# Patient Record
Sex: MALE | Race: WHITE | NOT HISPANIC OR LATINO | Employment: OTHER | URBAN - METROPOLITAN AREA
[De-identification: names, ages, dates, MRNs, and addresses within clinical notes are randomized per-mention and may not be internally consistent; named-entity substitution may affect disease eponyms.]

---

## 2024-01-11 ENCOUNTER — TELEPHONE (OUTPATIENT)
Dept: NEUROSURGERY | Facility: CLINIC | Age: 84
End: 2024-01-11
Payer: COMMERCIAL

## 2024-01-11 NOTE — TELEPHONE ENCOUNTER
[facility] Call Center    Phone Message    May a detailed message be left on voicemail: yes     Reason for Call: Other: Patient called requesting to schedule with Dr. Rosales, stating he is a former patient. Patient states he has been having trouble walking and has felt unsteady on his feet. He states he has had one stroke and three TIAs within the last couple months. Patient states he feels there is a sense of urgency to schedule, and that he has been tentatively diagnosis with Hydrocephalus By Dr. Smith with [facility]. Patient is unsure if Dr. Smith has sent his records or not, he states he has had extensive testing and is losing confidence in Dr. Smith.      Action Taken: Message routed to:  [facility] ([facility] Neurosurgery    Travel Screening: Not Applicable

## 2024-01-18 ENCOUNTER — CARE COORDINATION (OUTPATIENT)
Dept: NEUROSURGERY | Facility: CLINIC | Age: 84
End: 2024-01-18
Payer: COMMERCIAL

## 2024-01-18 NOTE — PROGRESS NOTES
Writer obtained current and corresponding imaging from [facility], sent message to provider for review.     Samia Schwartz LPN  Neurosurgery

## 2024-01-22 NOTE — TELEPHONE ENCOUNTER
RECORDS RECEIVED FROM: Care Everywhere   REASON FOR VISIT: Cerebrovascular dural AV fistula/ LOV 2015   Date of Appt: 1/24/24 @ 8:00 am    NOTES (FOR ALL VISITS) STATUS DETAILS   OFFICE NOTE from other specialist Care Everywhere 1/18/24, 12/20/22, 1/31/20  Ankit Castro DO  @[facility]    1/3/24 Jorge Smith DO  @[facility]    2/10/23 Alba Draper, MEMO, CNP  @[facility]    8/3/21 Hiwot Berkowitz MD  @[facility]    3/5/20 Elvia Callejas PA-C  [facility]    10/6/15, 7/7/15, 6/9/15, 3/3/15 Ember Rosales MD @[facility]NeuroSurg    5/12/15 Zoraida Mclaughlin PA @[facility]-NeuroSurg     DISCHARGE SUMMARY from hospital Care Everywhere 12/10/23-12/12/23 Pippa Tan MD  @[facility]    12/12/22-12/14/22 Shayne Ayers MD  @[facility]    1/22/20-1/23/20 Tatiana Ardon DO  @[facility]     4/29/15-5/2/15 Ember Rosales MD @[facility]     DISCHARGE REPORT from the ER Care Everywhere 1/5/24 Yazmin Rosario DO  @[facility] ED     OPERATIVE REPORT Internal 4/29/15 Ember Rosales MD @[facility] Midline Suboccipital Approach For Left Arteriovenous Fistula Removal; Intraoperative Angiography      MEDICATION LIST Internal    IMAGING  (FOR ALL VISITS)     MRI (HEAD, NECK, SPINE) Received [facility]  1/5/24 MR Brain  12/11/23 MR Cervical Spine  12/13/22 MR Brain     CT (HEAD, NECK, SPINE) Received [facility]  12/10/23 CT Head  11/19/23 CT Head  12/12/22 CTA Head Neck  12/12/22  CT Head

## 2024-01-24 ENCOUNTER — PRE VISIT (OUTPATIENT)
Dept: NEUROSURGERY | Facility: CLINIC | Age: 84
End: 2024-01-24

## 2024-01-24 ENCOUNTER — OFFICE VISIT (OUTPATIENT)
Dept: NEUROSURGERY | Facility: CLINIC | Age: 84
End: 2024-01-24
Payer: COMMERCIAL

## 2024-01-24 VITALS
OXYGEN SATURATION: 96 % | DIASTOLIC BLOOD PRESSURE: 55 MMHG | WEIGHT: 151 LBS | SYSTOLIC BLOOD PRESSURE: 89 MMHG | HEART RATE: 99 BPM | HEIGHT: 70 IN | BODY MASS INDEX: 21.62 KG/M2 | RESPIRATION RATE: 16 BRPM

## 2024-01-24 DIAGNOSIS — F41.9 ANXIETY: ICD-10-CM

## 2024-01-24 DIAGNOSIS — G91.2 NPH (NORMAL PRESSURE HYDROCEPHALUS) (H): Primary | ICD-10-CM

## 2024-01-24 PROCEDURE — 99204 OFFICE O/P NEW MOD 45 MIN: CPT | Performed by: NURSE PRACTITIONER

## 2024-01-24 RX ORDER — CLOPIDOGREL BISULFATE 75 MG/1
1 TABLET ORAL DAILY
COMMUNITY
Start: 2023-02-10 | End: 2024-05-28

## 2024-01-24 RX ORDER — ASPIRIN 81 MG/1
81 TABLET, CHEWABLE ORAL
COMMUNITY

## 2024-01-24 RX ORDER — FLUTICASONE PROPIONATE 50 MCG
2 SPRAY, SUSPENSION (ML) NASAL
COMMUNITY
Start: 2023-10-02

## 2024-01-24 RX ORDER — ATORVASTATIN CALCIUM 40 MG/1
40 TABLET, FILM COATED ORAL EVERY EVENING
COMMUNITY
Start: 2023-12-11

## 2024-01-24 RX ORDER — TAMSULOSIN HYDROCHLORIDE 0.4 MG/1
2 CAPSULE ORAL DAILY
COMMUNITY
Start: 2024-01-19

## 2024-01-24 RX ORDER — AMLODIPINE BESYLATE 5 MG/1
5 TABLET ORAL DAILY
COMMUNITY
Start: 2024-01-05 | End: 2024-07-16

## 2024-01-24 ASSESSMENT — PAIN SCALES - GENERAL: PAINLEVEL: NO PAIN (0)

## 2024-01-24 ASSESSMENT — PATIENT HEALTH QUESTIONNAIRE - PHQ9: SUM OF ALL RESPONSES TO PHQ QUESTIONS 1-9: 7

## 2024-01-24 NOTE — PROGRESS NOTES
[facility]  Department of Neurosurgery      Name: Perry Soni  MRN: [id number]  Age: 83 year old  : 1940  Referring provider: No ref. provider found  2024      Chief Complaint:   Hydrocephalus  New Patient    History of Present Illness:   Perry Soni is a 83 year old male with a history of severe cervical stenosis, craniocervical dural arteriovenous fistula (asymptomatic), S/p radiosurgery in 2012 at [facility], and subsequently underwent lateral suboccipital approach for disconnection of the arteriovenous fistula on 2015 by Dr. Rosales. The intraoperative angiogram demonstrated no residual AVF. Most recently seen by Dr. Rosales in 10/2015 and 5 year follow-up MRA was recommended at that time.     Then since 2022, patient had at least 2-3 TIAs. Most recently, he was admitted to [facility] in 2023 after frequent falls. Per notes in Care Everywhere, CT head on admission showing chronic changes without acute intracranial abnormality. Neurology consulted. MR thoracic, cervical spine recommended which showed a subacute compression fracture along superior endplate of T12, severe right neural foraminal stenosis at C3-4, and severe left neural foraminal stenosis with effacement left lateral recess at C5-6. Neurosurgery consulted. Increased cerebral ventricle size on CT with some transependymal flow concerning for potentialnormal pressure hydrocephalus. Outpatient workup for NPH with large volume lumbar puncture and therapy evaluation recommended with neurology.     He was evaluated by Dr. Jorge Smith at  and underwent large volume spina tap in 2023. Per prior notes, He did show improvement in his gait on objective measurements. Both he and his wife would agree that his gait, sense of stability etcetera improved in fairly quick fashion after the lumbar puncture. He also had 63 white blood cells off his spinal fluid. Brain MRI was completed on 2024 to follow-up which  showed Acute or early subacute lacunar infarct involving the left parietal lobe measuring up to 1.6 cm in length.     Today, patient presents for the evaluation with his wife, Jono to discuss possible options for recently diagnosed NPH. Patient reports gait impairment for the past 1-2 years. He had at least 2 falls recently. He started using a wheeled walker after his hospitalization in 12/2023. Today, patient and spouse reports that they noticed moderate improvement in his gait post spinal tap in 12/2023. Currently, he walks independently within his house and uses a cane for longer distances.     Patient reports cognitive concerns, word finding difficulty since his recent TIAs. He also has urinary urgency and frequency without any incontinence.     Patient completed a brain MRA recently. Images are not in PACS for review yet.     Today, patient had a high score for PHQ-9 screening. Patient reports that his recent changes in speech, memory along with gait imbalance has been frustrating. Prior to the recent TIAs, he was able to go to the gym three times a week, which he I not doing presently. He reports some passive thoughts of suicide, but none at the present time.    Review of Systems:   Pertinent items are noted in HPI or as in patient entered ROS below, remainder of complete ROS is negative.        No data to display                 Active Medications:     Current Outpatient Medications:     acetaminophen 650 MG TABS, Take 650 mg by mouth every 6 hours as needed for mild pain, Disp: 100 tablet, Rfl:     acyclovir (ZOVIRAX) 5 % ointment, Apply 0.5 inches topically 5 times daily. PRN, Disp: , Rfl:     FLUoxetine HCl (PROZAC PO), Take 10 mg by mouth daily , Disp: , Rfl:     simvastatin (ZOCOR) 20 MG tablet, Take 20 mg by mouth At Bedtime., Disp: , Rfl:       Allergies:   Wellbutrin [bupropion hcl]      Past Medical History:  Past Medical History:   Diagnosis Date    AVM (arteriovenous malformation)     Depression   "   Hyperlipidemia      Patient Active Problem List   Diagnosis    Cerebrovascular dural AV fistula    Dural arteriovenous fistula        Past Surgical History:  Past Surgical History:   Procedure Laterality Date    abd hernia repair      CRANIOTOMY, REPAIR ARTERIOVENOUS MALFORMATION, COMBINED Left 2015    Procedure: COMBINED CRANIOTOMY, REPAIR ARTERIOVENOUS MALFORMATION;  Surgeon: Ember Rosales MD;  Location: [facility] OR    HERNIA REPAIR         Family History:   No family history on file.      Social History:   Social History     Tobacco Use    Smoking status: Former     Packs/day: 1.00     Years: 15.00     Additional pack years: 0.00     Total pack years: 15.00     Types: Cigarettes     Quit date: 1981     Years since quittin.3    Smokeless tobacco: Never   Substance Use Topics    Alcohol use: No    Drug use: No        Physical Exam:   BP (!) 89/55 (BP Location: Right arm, Patient Position: Sitting)   Pulse 99   Resp 16   Ht 1.778 m (5' 10\")   Wt 68.5 kg (151 lb)   SpO2 96%   BMI 21.67 kg/m     General: No acute distress.   Neuro: The patient is fully oriented to self, place, month and year. Speech is normal. Extraocular movements are intact without nystagmus. There is no pronator drift. Tremors noted bilateral hands. Full strength in all extremities except mild weakness in right leg. Sensation intact throughout. No dysmetria with finger-nose-finger testing. Gait is slow with wide base.   Psych: Normal mood and affect. Behavior is normal.      Imagin2024 MRI Brain:  IMPRESSION:      1. 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   2. Stable ventriculomegaly.   3. Stable moderate periventricular and subcortical white matter T2/FLAIR signal hyperintensities, commonly seen with chronic microvascular ischemia.     12/10/2023 Head CT:  FINDINGS:  There is no evidence of intracranial hemorrhage, mass effect, midline " shift, acute infarct or hydrocephalus.  There are no abnormal intraaxial or extraaxial fluid collections.  Generalized volume loss and chronic small vessel ischemic change similar to prior examination.  Visualized paranasal sinuses are well-aerated and clear. Postsurgical changes of the posterior occipital bone.      Assessment:  Hydrocephalus  New Patient    Plan:  83 year old right handed male with a h/o recent TIA, stroke, severe cervical stenosis, craniocervical dural arteriovenous fistula (asymptomatic), S/p radiosurgery in 1/2012, disconnection of the arteriovenous fistula on 04/29/2015 by Dr. Rosales, recent imaging showing hydrocephalus is here for further evaluation and possible management of NPH. Will discuss case with Dr. Rosales and will contact the patient with next steps.     Addendum on 1/30/2024: Discussed with Dr. Rosales who recommended additievaluation by Neurology. Referral order placed.     Addendum on 2/15/2024:   Patient recently had an appointment with Dr. Cody Wallis, Neurologist at .  Based on his  evaluation and available information, it appears that patient's  symptoms align more closely with Normal Pressure Hydrocephalus except a Neuropsychological evaluation since symptom onset. Also,it is unclear what the cause for his elevated white blood cell count in the CSF in 12/2023. There is no evidence of inflammatory disease on MRI of the brain. Ideally a repeat LP would be undertaken but with his recent stroke he is reluctant to have the patient hold his antiplatelet agents this soon after his stroke.      We will schedule the patient for a phone visit with Dr. Rosales to discuss options. Message sent to clinic team.     Vesta Venegas CNP  Department of Neurosurgery    I spent 50 minutes on patient care activities related to this encounter on the date of service, including time spent reviewing the chart, obtaining history and examination and in counseling the patient, and in documentation  in the electronic medical record.

## 2024-01-24 NOTE — LETTER
2024       RE: Perry Soni  [address] [place] [address]     Dear Colleague,    Thank you for referring your patient, Perry Soni, to the [facility] [place] [facility]. Please see a copy of my visit note below.    [facility]  Department of Neurosurgery      Name: Perry Soni  MRN: [id number]  Age: 83 year old  : 1940  Referring provider: No ref. provider found  2024      Chief Complaint:   Hydrocephalus  New Patient    History of Present Illness:   Perry Soni is a 83 year old male with a history of severe cervical stenosis, craniocervical dural arteriovenous fistula (asymptomatic), S/p radiosurgery in 2012 at [facility], and subsequently underwent lateral suboccipital approach for disconnection of the arteriovenous fistula on 2015 by Dr. Rosales. The intraoperative angiogram demonstrated no residual AVF. Most recently seen by Dr. Rosales in 10/2015 and 5 year follow-up MRA was recommended at that time.     Then since 2022, patient had at least 2-3 TIAs. Most recently, he was admitted to [facility] in 2023 after frequent falls. Per notes in Care Everywhere, CT head on admission showing chronic changes without acute intracranial abnormality. Neurology consulted. MR thoracic, cervical spine recommended which showed a subacute compression fracture along superior endplate of T12, severe right neural foraminal stenosis at C3-4, and severe left neural foraminal stenosis with effacement left lateral recess at C5-6. Neurosurgery consulted. Increased cerebral ventricle size on CT with some transependymal flow concerning for potentialnormal pressure hydrocephalus. Outpatient workup for NPH with large volume lumbar puncture and therapy evaluation recommended with neurology.     He was evaluated by Dr. Jorge Smith at  and underwent large volume spina tap in 2023. Per  prior notes, He did show improvement in his gait on objective measurements. Both he and his wife would agree that his gait, sense of stability etcetera improved in fairly quick fashion after the lumbar puncture. He also had 63 white blood cells off his spinal fluid. Brain MRI was completed on 1/5/2024 to follow-up which showed Acute or early subacute lacunar infarct involving the left parietal lobe measuring up to 1.6 cm in length.     Today, patient presents for the evaluation with his wife, Jono to discuss possible options for recently diagnosed NPH. Patient reports gait impairment for the past 1-2 years. He had at least 2 falls recently. He started using a wheeled walker after his hospitalization in 12/2023. Today, patient and spouse reports that they noticed moderate improvement in his gait post spinal tap in 12/2023. Currently, he walks independently within his house and uses a cane for longer distances.     Patient reports cognitive concerns, word finding difficulty since his recent TIAs. He also has urinary urgency and frequency without any incontinence.     Patient completed a brain MRA recently. Images are not in PACS for review yet.     Today, patient had a high score for PHQ-9 screening. Patient reports that his recent changes in speech, memory along with gait imbalance has been frustrating. Prior to the recent TIAs, he was able to go to the gym three times a week, which he I not doing presently. He reports some passive thoughts of suicide, but none at the present time.    Review of Systems:   Pertinent items are noted in HPI or as in patient entered ROS below, remainder of complete ROS is negative.        No data to display                 Active Medications:     Current Outpatient Medications:     acetaminophen 650 MG TABS, Take 650 mg by mouth every 6 hours as needed for mild pain, Disp: 100 tablet, Rfl:     acyclovir (ZOVIRAX) 5 % ointment, Apply 0.5 inches topically 5 times daily. PRN, Disp: , Rfl:  "    FLUoxetine HCl (PROZAC PO), Take 10 mg by mouth daily , Disp: , Rfl:     simvastatin (ZOCOR) 20 MG tablet, Take 20 mg by mouth At Bedtime., Disp: , Rfl:       Allergies:   Wellbutrin [bupropion hcl]      Past Medical History:  Past Medical History:   Diagnosis Date    AVM (arteriovenous malformation)     Depression     Hyperlipidemia      Patient Active Problem List   Diagnosis    Cerebrovascular dural AV fistula    Dural arteriovenous fistula        Past Surgical History:  Past Surgical History:   Procedure Laterality Date    abd hernia repair      CRANIOTOMY, REPAIR ARTERIOVENOUS MALFORMATION, COMBINED Left 2015    Procedure: COMBINED CRANIOTOMY, REPAIR ARTERIOVENOUS MALFORMATION;  Surgeon: Ember Rosales MD;  Location: [facility] OR    HERNIA REPAIR         Family History:   No family history on file.      Social History:   Social History     Tobacco Use    Smoking status: Former     Packs/day: 1.00     Years: 15.00     Additional pack years: 0.00     Total pack years: 15.00     Types: Cigarettes     Quit date: 1981     Years since quittin.3    Smokeless tobacco: Never   Substance Use Topics    Alcohol use: No    Drug use: No        Physical Exam:   BP (!) 89/55 (BP Location: Right arm, Patient Position: Sitting)   Pulse 99   Resp 16   Ht 1.778 m (5' 10\")   Wt 68.5 kg (151 lb)   SpO2 96%   BMI 21.67 kg/m     General: No acute distress.   Neuro: The patient is fully oriented to self, place, month and year. Speech is normal. Extraocular movements are intact without nystagmus. There is no pronator drift. Tremors noted bilateral hands. Full strength in all extremities except mild weakness in right leg. Sensation intact throughout. No dysmetria with finger-nose-finger testing. Gait is slow with wide base.   Psych: Normal mood and affect. Behavior is normal.      Imagin2024 MRI Brain:  IMPRESSION:      1. Acute or early subacute lacunar infarct involving the left parietal lobe " measuring up to 1.6 cm in length.   2. Stable ventriculomegaly.   3. Stable moderate periventricular and subcortical white matter T2/FLAIR signal hyperintensities, commonly seen with chronic microvascular ischemia.     12/10/2023 Head CT:  FINDINGS:  There is no evidence of intracranial hemorrhage, mass effect, midline shift, acute infarct or hydrocephalus.  There are no abnormal intraaxial or extraaxial fluid collections.  Generalized volume loss and chronic small vessel ischemic change similar to prior examination.  Visualized paranasal sinuses are well-aerated and clear. Postsurgical changes of the posterior occipital bone.      Assessment:  Hydrocephalus  New Patient    Plan:  83 year old right handed male with a h/o recent TIA, stroke, severe cervical stenosis, craniocervical dural arteriovenous fistula (asymptomatic), S/p radiosurgery in 1/2012, disconnection of the arteriovenous fistula on 04/29/2015 by Dr. Rosales, recent imaging showing hydrocephalus is here for further evaluation and possible management of NPH. Will discuss case with Dr. Rosales and will contact the patient with next steps.       I spent 50 minutes on patient care activities related to this encounter on the date of service, including time spent reviewing the chart, obtaining history and examination and in counseling the patient, and in documentation in the electronic medical record.      Again, thank you for allowing me to participate in the care of your patient.      Sincerely,    MEMO Rosa CNP

## 2024-01-30 ENCOUNTER — TELEPHONE (OUTPATIENT)
Dept: NEUROSURGERY | Facility: CLINIC | Age: 84
End: 2024-01-30
Payer: COMMERCIAL

## 2024-01-30 NOTE — TELEPHONE ENCOUNTER
----- Message from MEMO Rosa CNP sent at 1/30/2024  4:31 PM CST -----  Hi,    Can you please let the patient know that I spoke with Dr. Rosales and he recommended referral to neurology for additional work up? Referral is placed.     Thanks,  Vesta

## 2024-01-30 NOTE — TELEPHONE ENCOUNTER
Spoke with Perry, explained Dr Rosales referred patient to Neurology.  Neurology Provider Referral completed.      Voices understanding.

## 2024-02-16 ENCOUNTER — TELEPHONE (OUTPATIENT)
Dept: NEUROSURGERY | Facility: CLINIC | Age: 84
End: 2024-02-16
Payer: COMMERCIAL

## 2024-02-26 NOTE — TELEPHONE ENCOUNTER
RECORDS RECEIVED FROM: Care Everywhere   REASON FOR VISIT: Discuss options   PROVIDER: Ember Rosales MD   DATE OF APPT: 4/23/24 @ 10:30 am    NOTES (FOR ALL VISITS) STATUS DETAILS   OFFICE NOTE from referring provider Internal 1/30/24 [facility] C[facility]     OFFICE NOTE from other specialist Care Everywhere 2/9/24 [facility]    1/18/24, 12/20/22, 1/31/20  Ankit Castro DO  @[facility]     1/3/24 Jorge Smith DO  @[facility]     2/10/23 Alba Draper APRN, CNP  [facility]     8/3/21 Hiwot Berkowitz MD  @[facility]     3/5/20 Elvia Callejas PA-C  @[facility] Neurology     10/6/15, 7/7/15, 6/9/15, 3/3/15 [facility]     5/12/15 [facility]     DISCHARGE SUMMARY from hospital Care Everywhere 12/10/23-12/12/23 Pippa Tan MD  @[facility]     12/12/22-12/14/22 Shayne Ayers MD  @[facility]     1/22/20-1/23/20 Tatiana Ardon DO  @[facility]      4/29/15-5/2/15 Ember Rosales MD @[facility]     DISCHARGE REPORT from the ER Care Everywhere 1/5/24 Yazmin Rosario DO  @[facility] ED      OPERATIVE REPORT Internal 4/29/15 Ember Rosales MD @[facility] Midline Suboccipital Approach For Left Arteriovenous Fistula Removal; Intraoperative Angiography       MEDICATION LIST Internal    IMAGING  (FOR ALL VISITS)     MRI (HEAD, NECK, SPINE) Received [facility]  1/23/24 MRA Brain ([place])  1/23/24 MRA Neck ([place])  1/5/24 MR Brain  12/11/23 MR Cervical Spine  12/13/22 MR Brain     CT (HEAD, NECK, SPINE) Received [facility]  12/10/23 CT Head  11/19/23 CT Head  12/12/22 CTA Head Neck  12/12/22  CT Head

## 2024-03-20 ENCOUNTER — TRANSFERRED RECORDS (OUTPATIENT)
Dept: HEALTH INFORMATION MANAGEMENT | Facility: CLINIC | Age: 84
End: 2024-03-20

## 2024-04-23 ENCOUNTER — VIRTUAL VISIT (OUTPATIENT)
Dept: NEUROSURGERY | Facility: CLINIC | Age: 84
End: 2024-04-23
Attending: NURSE PRACTITIONER
Payer: COMMERCIAL

## 2024-04-23 ENCOUNTER — TELEPHONE (OUTPATIENT)
Dept: NEUROSURGERY | Facility: CLINIC | Age: 84
End: 2024-04-23

## 2024-04-23 ENCOUNTER — PRE VISIT (OUTPATIENT)
Dept: NEUROSURGERY | Facility: CLINIC | Age: 84
End: 2024-04-23

## 2024-04-23 VITALS — WEIGHT: 151 LBS | HEIGHT: 70 IN | BODY MASS INDEX: 21.62 KG/M2

## 2024-04-23 DIAGNOSIS — G91.2 IDIOPATHIC NORMAL PRESSURE HYDROCEPHALUS (INPH) (H): ICD-10-CM

## 2024-04-23 DIAGNOSIS — I67.1 CEREBROVASCULAR DURAL AV FISTULA: Primary | ICD-10-CM

## 2024-04-23 PROCEDURE — 99443 PR PHYSICIAN TELEPHONE EVALUATION 21-30 MIN: CPT | Mod: 95 | Performed by: NEUROLOGICAL SURGERY

## 2024-04-23 RX ORDER — CEPHALEXIN 500 MG/1
CAPSULE ORAL
Status: CANCELLED | OUTPATIENT
Start: 2024-04-23

## 2024-04-23 RX ORDER — CEPHALEXIN 500 MG/1
CAPSULE ORAL
Status: ON HOLD | COMMUNITY
Start: 2024-04-15 | End: 2024-06-17

## 2024-04-23 ASSESSMENT — PAIN SCALES - GENERAL: PAINLEVEL: MILD PAIN (3)

## 2024-04-23 NOTE — NURSING NOTE
Is the patient currently in the state of [place]? YES    Visit mode:TELEPHONE    If the visit is dropped, the patient can be reconnected by: TELEPHONE VISIT: Phone number: [phone]    Will anyone else be joining the visit? NO  (If patient encounters technical issues they should call [phone] :[phone])    How would you like to obtain your AVS? MyChart    Are changes needed to the allergy or medication list? No    Are refills needed on medications prescribed by this physician? YES, pt requesting refill on antibiotics, not prescribed by Dr. Rosales    Reason for visit: Consult    Madelyn BURTONF

## 2024-04-23 NOTE — TELEPHONE ENCOUNTER
Left voicemail for patient regarding scheduling surgery with Dr. Rosales.  Provided contact number to discuss.  [phone]    Chelsea Mckeon, on 4/23/2024 at 3:06 PM

## 2024-04-23 NOTE — PROGRESS NOTES
Virtual Visit Details  Date of service: 4/23/2024  Type of service: Telephone visit  Length of service: 30 minutes    Diego Barcenas MD  [facility]  [address] [place] [address]       Dear Dr. Barcenas:    We spoke to Mr. Soni as part of a telephone visit in cranial neurosurgery clinic today.  We know him well, we have not spoken to him in several years.  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  He has had 2 years of gait instability and has fallen once.  He is able to ambulate independently around his home but uses a cane occasionally.  He has had urinary frequency and urgency.  He had a Rendon catheter placed last week, and he has a urology appointment pending.  He feels that his cognition has declined particularly with memory, though he was quite sharp during our conversation today.  He describes word finding difficulty.  Neuropsychological testing was done a few weeks ago with Dr. Castillo ([id number]) but those records were not available to us.     He has undergone a thorough workup with Dr. Wallis, and we have reviewed his notes.  Mr. Soni underwent a lumbar puncture in December 2023, when 30 cc of clear CSF was removed.  There was improvement in his gait following the LP.  Of note, the CSF had an elevated white blood cell count (63).  He also underwent MRI of the brain in January to evaluate the pleocytosis.  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  He has ventriculomegaly.  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  He is on aspirin and Plavix currently.  He lives with his wife and daughter in [place].    Over the phone, his speech, language, and phonation were normal.    His imaging study showed ventriculomegaly.  He has some diffuse atrophy but the ventricle  enlargement is somewhat disproportionate.    Mr. Soni will likely benefit from permanent CSF diversion with a ventriculoperitoneal shunt.  However, it will be prudent to repeat a lumbar puncture to check the cell counts before proceeding with shunt placement.  We instructed him to discontinue the Plavix and remain on the aspirin.  We will repeat the lumbar puncture under fluoroscopic guidance sometime in the next week.  Depending on the results, we can make plans about shunt placement.  We will keep you informed of his progress.  Please nested contact us with questions.    Sincerely,        Ember Rosales MD  Department of Neurosurgery

## 2024-04-23 NOTE — PATIENT INSTRUCTIONS
We will contact you to schedule your lumbar puncture.     Stroke & Endovascular RN Care Coordinators:    Jackie Mahoney, RN, BSN  Sunni Adams, RN, CNRN, SCRN    If you have any questions please contact the RN Care Coordinators at [phone], option 1.     After business hours call the  at [phone] and have the Neuro-Interventional Fellow paged.    Thank you for choosing [facility] for your health care needs.

## 2024-04-23 NOTE — LETTER
4/23/2024       RE: Perry Soni  [address] [place] [address]       Dear Colleague,    Thank you for referring your patient, Perry Soni, to the [facility] NEUROSURGERY CLINIC [place] at [facility]. Please see a copy of my visit note below.    Diego Barcenas MD  [facility]  [address] [place] [address]     Dear Dr. Barcenas:    We spoke to Mr. Soni as part of a telephone visit in cranial neurosurgery clinic today.  We know him well, we have not spoken to him in several years.  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  He has had 2 years of gait instability and has fallen once.  He is able to ambulate independently around his home but uses a cane occasionally.  He has had urinary frequency and urgency.  He had a Rendon catheter placed last week, and he has a urology appointment pending.  He feels that his cognition has declined particularly with memory, though he was quite sharp during our conversation today.  He describes word finding difficulty.  Neuropsychological testing was done a few weeks ago with Dr. Castillo ([id number]) but those records were not available to us.     He has undergone a thorough workup with Dr. Wallis, and we have reviewed his notes.  Mr. Soni underwent a lumbar puncture in December 2023, when 30 cc of clear CSF was removed.  There was improvement in his gait following the LP.  Of note, the CSF had an elevated white blood cell count (63).  He also underwent MRI of the brain in January to evaluate the pleocytosis.  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  He has ventriculomegaly.  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  He is on aspirin and Plavix currently.  He lives with his  wife and daughter in [place].    Over the phone, his speech, language, and phonation were normal.    His imaging study showed ventriculomegaly.  He has some diffuse atrophy but the ventricle enlargement is somewhat disproportionate.    Mr. Soni will likely benefit from permanent CSF diversion with a ventriculoperitoneal shunt.  However, it will be prudent to repeat a lumbar puncture to check the cell counts before proceeding with shunt placement.  We instructed him to discontinue the Plavix and remain on the aspirin.  We will repeat the lumbar puncture under fluoroscopic guidance sometime in the next week.  Depending on the results, we can make plans about shunt placement.  We will keep you informed of his progress.  Please nested contact us with questions.          Again, thank you for allowing me to participate in the care of your patient.      Sincerely,    Ember Rosales MD

## 2024-04-24 ENCOUNTER — PATIENT OUTREACH (OUTPATIENT)
Dept: NEUROLOGY | Facility: CLINIC | Age: 84
End: 2024-04-24
Payer: COMMERCIAL

## 2024-04-24 NOTE — PROGRESS NOTES
Informed patient of procedure instructions. Confirmed date and time. Patient verbalized understanding. All questions answered. Patient instructions sent via mail. Patient has my contact information and was encouraged to call with questions/concerns.   Jackie Mahoney RN 4/24/2024 2:53 PM

## 2024-04-24 NOTE — PATIENT INSTRUCTIONS
You are scheduled for a Lumbar Puncture with Dr. Rosales on 5/1/24. Arrival Time: 8:30 AM Procedure Time: 10:00 AM.     Please follow these instructions:     * Check in at the Gold Waiting Room at the [facility]. The address is [address] [place] [address]. The phone number is [phone].      * Nothing to eat for 8 hours prior to arrival time. You may drink clear liquids (includes water, Jell-O, clear broth, apple juice or any non-carbonated beverage that you can see through) up until 2 hours prior to arrival time.      * You may take your medications with a sip of water the morning of the procedure.   Stop Plavix, stay on Aspirin.      * You will be discharged the same day. You must have a  home and someone that can stay with you through the night.      PLEASE NOTE our COVID-19 visitors policy: Adult surgical and procedural patients can have two visitors throughout the surgery process. The two visitors may include children of any age; please note, children cannot stay in the waiting room alone.     All discharge instructions will be given to the  or volunteer. Documentation for the post-operative plan will be given to the patient and . Patients are required to have someone to stay with them for 24 hours after their procedure.     If you have questions regarding your procedure, please contact me at [phone], option 1.     If you need to cancel, reschedule or have procedure scheduling related questions, please call Neuroendovascular IR Procedure Scheduling at [phone].     Thank you,  Sunni Adams, RN, CNRN, SCRN  Jackie Mahoney, RN, BSN  Stroke & Neuroendovascular Care Coordinator

## 2024-04-24 NOTE — TELEPHONE ENCOUNTER
Received call that patient's son's phone number is incorrect. It does not belong to him. Left voicemail for patient regarding scheduling surgery with Dr. Rosales. Requested call back as soon as possible.  Provided contact number to discuss.  [phone]    Chelsea Mckeon, on 4/24/2024 at 9:01 AM

## 2024-04-24 NOTE — TELEPHONE ENCOUNTER
Spoke with the patient and was able to confirm all scheduled information.     Patient is scheduled for surgery with: Dr. Rosales    Surgery Date: 5/1     Location: [facility]    H&P: NOT NEEDED      Post-op: not needed at this time    Patient is aware of arrival and procedure times.       Patient questions/concerns: N/A     Surgery packet N/A       Chelsea Mckeon on 4/24/2024 at 1:16 PM

## 2024-04-24 NOTE — TELEPHONE ENCOUNTER
Left voicemail for  patient's son  regarding scheduling surgery with Dr. Rosales. Need to see which date next week works for them. Left voicemail for patient yesterday with no response. No MyChart available.  Provided contact number to discuss.  [phone]    Chelsea Mckeon, on 4/24/2024 at 8:41 AM

## 2024-04-30 ENCOUNTER — DOCUMENTATION ONLY (OUTPATIENT)
Dept: NEUROSURGERY | Facility: CLINIC | Age: 84
End: 2024-04-30
Payer: COMMERCIAL

## 2024-05-01 ENCOUNTER — HOSPITAL ENCOUNTER (OUTPATIENT)
Facility: CLINIC | Age: 84
Discharge: HOME | End: 2024-05-01
Attending: NEUROLOGICAL SURGERY | Admitting: NEUROLOGICAL SURGERY
Payer: COMMERCIAL

## 2024-05-01 ENCOUNTER — APPOINTMENT (OUTPATIENT)
Dept: INTERVENTIONAL RADIOLOGY/VASCULAR | Facility: CLINIC | Age: 84
End: 2024-05-01
Attending: NEUROLOGICAL SURGERY
Payer: COMMERCIAL

## 2024-05-01 ENCOUNTER — APPOINTMENT (OUTPATIENT)
Dept: MEDSURG UNIT | Facility: CLINIC | Age: 84
End: 2024-05-01
Attending: NEUROLOGICAL SURGERY
Payer: COMMERCIAL

## 2024-05-01 VITALS
HEART RATE: 81 BPM | SYSTOLIC BLOOD PRESSURE: 119 MMHG | RESPIRATION RATE: 16 BRPM | TEMPERATURE: 98 F | WEIGHT: 146 LBS | OXYGEN SATURATION: 96 % | BODY MASS INDEX: 20.95 KG/M2 | DIASTOLIC BLOOD PRESSURE: 57 MMHG

## 2024-05-01 DIAGNOSIS — R83.9 CSF ABNORMAL: ICD-10-CM

## 2024-05-01 DIAGNOSIS — I67.1 CEREBROVASCULAR DURAL AV FISTULA: ICD-10-CM

## 2024-05-01 LAB
ANION GAP SERPL CALCULATED.3IONS-SCNC: 12 MMOL/L (ref 7–15)
APPEARANCE CSF: CLEAR
APTT PPP: 33 SECONDS (ref 22–38)
BUN SERPL-MCNC: 22.3 MG/DL (ref 8–23)
CALCIUM SERPL-MCNC: 8.8 MG/DL (ref 8.8–10.2)
CHLORIDE SERPL-SCNC: 105 MMOL/L (ref 98–107)
COLOR CSF: COLORLESS
CREAT SERPL-MCNC: 1.29 MG/DL (ref 0.67–1.17)
DEPRECATED HCO3 PLAS-SCNC: 22 MMOL/L (ref 22–29)
EGFRCR SERPLBLD CKD-EPI 2021: 56 ML/MIN/1.73M2
ERYTHROCYTE [DISTWIDTH] IN BLOOD BY AUTOMATED COUNT: 14.2 % (ref 10–15)
GLUCOSE CSF-MCNC: 41 MG/DL (ref 40–70)
GLUCOSE SERPL-MCNC: 89 MG/DL (ref 70–99)
HCT VFR BLD AUTO: 40.2 % (ref 40–53)
HGB BLD-MCNC: 13.3 G/DL (ref 13.3–17.7)
HOLD SPECIMEN: NORMAL
HOLD SPECIMEN: NORMAL
INR PPP: 1.2 (ref 0.85–1.15)
LYMPH ABN NFR CSF MANUAL: 86 %
MCH RBC QN AUTO: 29.6 PG (ref 26.5–33)
MCHC RBC AUTO-ENTMCNC: 33.1 G/DL (ref 31.5–36.5)
MCV RBC AUTO: 89 FL (ref 78–100)
MONOS+MACROS NFR CSF MANUAL: 8 %
NEUTROPHILS NFR CSF MANUAL: 6 %
PLATELET # BLD AUTO: 233 10E3/UL (ref 150–450)
POTASSIUM SERPL-SCNC: 3.8 MMOL/L (ref 3.4–5.3)
PROT CSF-MCNC: 61.6 MG/DL (ref 15–45)
RBC # BLD AUTO: 4.5 10E6/UL (ref 4.4–5.9)
RBC # CSF MANUAL: 15 /UL (ref 0–2)
SODIUM SERPL-SCNC: 139 MMOL/L (ref 135–145)
TUBE # CSF: 3
WBC # BLD AUTO: 6.2 10E3/UL (ref 4–11)
WBC # CSF MANUAL: 61 /UL (ref 0–5)

## 2024-05-01 PROCEDURE — 85610 PROTHROMBIN TIME: CPT | Performed by: STUDENT IN AN ORGANIZED HEALTH CARE EDUCATION/TRAINING PROGRAM

## 2024-05-01 PROCEDURE — 85014 HEMATOCRIT: CPT | Performed by: STUDENT IN AN ORGANIZED HEALTH CARE EDUCATION/TRAINING PROGRAM

## 2024-05-01 PROCEDURE — 999N000142 HC STATISTIC PROCEDURE PREP ONLY

## 2024-05-01 PROCEDURE — 89051 BODY FLUID CELL COUNT: CPT | Performed by: INTERNAL MEDICINE

## 2024-05-01 PROCEDURE — 87529 HSV DNA AMP PROBE: CPT | Mod: 59

## 2024-05-01 PROCEDURE — 88108 CYTOPATH CONCENTRATE TECH: CPT | Mod: TC | Performed by: STUDENT IN AN ORGANIZED HEALTH CARE EDUCATION/TRAINING PROGRAM

## 2024-05-01 PROCEDURE — 99152 MOD SED SAME PHYS/QHP 5/>YRS: CPT | Mod: GC | Performed by: NEUROLOGICAL SURGERY

## 2024-05-01 PROCEDURE — 84157 ASSAY OF PROTEIN OTHER: CPT | Performed by: STUDENT IN AN ORGANIZED HEALTH CARE EDUCATION/TRAINING PROGRAM

## 2024-05-01 PROCEDURE — 62328 DX LMBR SPI PNXR W/FLUOR/CT: CPT | Mod: GC | Performed by: NEUROLOGICAL SURGERY

## 2024-05-01 PROCEDURE — 88108 CYTOPATH CONCENTRATE TECH: CPT | Mod: 26 | Performed by: PATHOLOGY

## 2024-05-01 PROCEDURE — 85730 THROMBOPLASTIN TIME PARTIAL: CPT | Performed by: STUDENT IN AN ORGANIZED HEALTH CARE EDUCATION/TRAINING PROGRAM

## 2024-05-01 PROCEDURE — 250N000009 HC RX 250: Performed by: STUDENT IN AN ORGANIZED HEALTH CARE EDUCATION/TRAINING PROGRAM

## 2024-05-01 PROCEDURE — 87075 CULTR BACTERIA EXCEPT BLOOD: CPT | Performed by: INTERNAL MEDICINE

## 2024-05-01 PROCEDURE — 87205 SMEAR GRAM STAIN: CPT | Performed by: STUDENT IN AN ORGANIZED HEALTH CARE EDUCATION/TRAINING PROGRAM

## 2024-05-01 PROCEDURE — 999N000132 HC STATISTIC PP CARE STAGE 1

## 2024-05-01 PROCEDURE — 258N000003 HC RX IP 258 OP 636: Performed by: NEUROLOGICAL SURGERY

## 2024-05-01 PROCEDURE — 36415 COLL VENOUS BLD VENIPUNCTURE: CPT | Performed by: STUDENT IN AN ORGANIZED HEALTH CARE EDUCATION/TRAINING PROGRAM

## 2024-05-01 PROCEDURE — 82945 GLUCOSE OTHER FLUID: CPT | Performed by: STUDENT IN AN ORGANIZED HEALTH CARE EDUCATION/TRAINING PROGRAM

## 2024-05-01 PROCEDURE — 82374 ASSAY BLOOD CARBON DIOXIDE: CPT | Performed by: STUDENT IN AN ORGANIZED HEALTH CARE EDUCATION/TRAINING PROGRAM

## 2024-05-01 PROCEDURE — 99152 MOD SED SAME PHYS/QHP 5/>YRS: CPT

## 2024-05-01 PROCEDURE — 250N000011 HC RX IP 250 OP 636: Performed by: STUDENT IN AN ORGANIZED HEALTH CARE EDUCATION/TRAINING PROGRAM

## 2024-05-01 RX ORDER — NALOXONE HYDROCHLORIDE 0.4 MG/ML
0.2 INJECTION, SOLUTION INTRAMUSCULAR; INTRAVENOUS; SUBCUTANEOUS
Status: DISCONTINUED | OUTPATIENT
Start: 2024-05-01 | End: 2024-05-01 | Stop reason: HOSPADM

## 2024-05-01 RX ORDER — FENTANYL CITRATE 50 UG/ML
25-50 INJECTION, SOLUTION INTRAMUSCULAR; INTRAVENOUS EVERY 5 MIN PRN
Status: DISCONTINUED | OUTPATIENT
Start: 2024-05-01 | End: 2024-05-01 | Stop reason: HOSPADM

## 2024-05-01 RX ORDER — FLUMAZENIL 0.1 MG/ML
0.2 INJECTION, SOLUTION INTRAVENOUS
Status: DISCONTINUED | OUTPATIENT
Start: 2024-05-01 | End: 2024-05-01 | Stop reason: HOSPADM

## 2024-05-01 RX ORDER — NALOXONE HYDROCHLORIDE 0.4 MG/ML
0.4 INJECTION, SOLUTION INTRAMUSCULAR; INTRAVENOUS; SUBCUTANEOUS
Status: DISCONTINUED | OUTPATIENT
Start: 2024-05-01 | End: 2024-05-01 | Stop reason: HOSPADM

## 2024-05-01 RX ORDER — LIDOCAINE HYDROCHLORIDE 10 MG/ML
1-30 INJECTION, SOLUTION EPIDURAL; INFILTRATION; INTRACAUDAL; PERINEURAL
Status: COMPLETED | OUTPATIENT
Start: 2024-05-01 | End: 2024-05-01

## 2024-05-01 RX ORDER — SODIUM CHLORIDE 9 MG/ML
INJECTION, SOLUTION INTRAVENOUS CONTINUOUS
Status: DISCONTINUED | OUTPATIENT
Start: 2024-05-01 | End: 2024-05-01 | Stop reason: HOSPADM

## 2024-05-01 RX ADMIN — MIDAZOLAM 0.5 MG: 1 INJECTION INTRAMUSCULAR; INTRAVENOUS at 11:17

## 2024-05-01 RX ADMIN — MIDAZOLAM 0.5 MG: 1 INJECTION INTRAMUSCULAR; INTRAVENOUS at 11:05

## 2024-05-01 RX ADMIN — LIDOCAINE HYDROCHLORIDE 10 ML: 10 INJECTION, SOLUTION EPIDURAL; INFILTRATION; INTRACAUDAL; PERINEURAL at 11:17

## 2024-05-01 RX ADMIN — FENTANYL CITRATE 25 MCG: 50 INJECTION, SOLUTION INTRAMUSCULAR; INTRAVENOUS at 11:22

## 2024-05-01 RX ADMIN — SODIUM CHLORIDE: 9 INJECTION, SOLUTION INTRAVENOUS at 09:51

## 2024-05-01 RX ADMIN — FENTANYL CITRATE 25 MCG: 50 INJECTION, SOLUTION INTRAMUSCULAR; INTRAVENOUS at 11:12

## 2024-05-01 RX ADMIN — MIDAZOLAM 0.5 MG: 1 INJECTION INTRAMUSCULAR; INTRAVENOUS at 11:12

## 2024-05-01 RX ADMIN — MIDAZOLAM 0.5 MG: 1 INJECTION INTRAMUSCULAR; INTRAVENOUS at 11:22

## 2024-05-01 RX ADMIN — FENTANYL CITRATE 25 MCG: 50 INJECTION, SOLUTION INTRAMUSCULAR; INTRAVENOUS at 11:05

## 2024-05-01 RX ADMIN — FENTANYL CITRATE 25 MCG: 50 INJECTION, SOLUTION INTRAMUSCULAR; INTRAVENOUS at 11:17

## 2024-05-01 ASSESSMENT — ACTIVITIES OF DAILY LIVING (ADL)
ADLS_ACUITY_SCORE: 36

## 2024-05-01 NOTE — PROGRESS NOTES
Pt's wife, Jono, arrived at bedside. Discharge instructions reviewed with pt & pt's wife. Questions & concerns addressed. Pt tolerated flat bedrest without complications. Mid low back procedure site remained unchanged; stable. Pt tolerated ambulation around unit with this RN using his own cane. Pt able to dress with some assistance. Pt left unit in WC with this RN to meet wife at [facility] services to discharge home. Pt stable.

## 2024-05-01 NOTE — PROGRESS NOTES
Pt arrived to  for LP procedure w/ neuro IR. VSS. Labs drawn, IV fluids started, consent done. Wife Jono in gold waiting room and will be post transportation.

## 2024-05-01 NOTE — PROCEDURES
[facility]     Endovascular Surgical Neuroradiology Post-Procedure Note    Pre-Procedure Diagnosis:  NPH, CSF leukocytosis  Post-Procedure Diagnosis:  same    Procedure(s):   Fluoroscopic Lumbar Puncture with collection of CSF samples    Findings: 17 ml of clear spinal fluid collected     Plan:  -1 hour flat bed rest  - Pending CSF lab results, will be checked prior to lumbar drain procedure.    Primary Surgeon:  Dr. Ember Rosales  Secondary Surgeon:  Not applicable  Secondary Surgeon Review:  None  Fellow:  Fran Oro  Additional Assistants:  none    Prior to the start of the procedure and with procedural staff participation, I verbally confirmed: the patient s identity using two indicators, relevant allergies, that the procedure was appropriate and matched the consent or emergent situation, and that the correct equipment/implants were available. Immediately prior to starting the procedure I conducted the Time Out with the procedural staff and re-confirmed the patient s name, procedure, and site/side. (The Joint Commission universal protocol was followed.)  Yes    PRU value: Not applicable    Anesthesia:  Conscious Sedation  Medications:  Midazolam 2 mg, Fentanyl 100 mcg   Puncture site:  L2/ L3 vertebral space    Fluoroscopy time (minutes):  12.9  Radiation dose (mGy):  343  Contrast amount (mL):  none    Estimated blood loss (mL):  1-3 cc        Disposition:  Home after recovery.        Sedation Post-Procedure Summary    Sedatives: Midazolam 2 mg, Fentanyl 100 mcg     Vital signs and pulse oximetry were monitored and remained stable throughout the procedure, and sedation was maintained until the procedure was complete.  The patient was monitored by staff until sedation discharge criteria were met.    Patient tolerance:  Patient tolerated the procedure well with no immediate complications.    Time of sedation in minutes:  32 minutes from beginning to end of  physician one to one monitoring.    Verónica Oro MD  Pager:  [phone]    See official report in PACS  MALCOLM Rosales MD

## 2024-05-01 NOTE — PROGRESS NOTES
[facility]     Endovascular Surgical Neuroradiology Pre-Procedure Note      HPI:  Perry Soni is a 83 year old male with known medical history of dural AVF of the craniocervical junction. In addition to NPH with symptoms of gait imbalance with voiding difficulties and decline in cognition. He is here today for LP to check cell count prior to his planned shunt placement.    Medical History:  Reviewed    Surgical History:  Reviewed    Family History:  Reviewed    Social History:  Reviewed    Allergies:  Reviewed    Is there a contrast allergy?  No    Medications:  I have reviewed this patient's current medications.    ROS:  The 10 point Review of Systems is negative other than noted in the HPI or here.     PHYSICAL EXAMINATION  Vital Signs:  B/P: Data Unavailable,  T: Data Unavailable,  P: Data Unavailable,  R: Data Unavailable    Cardio:  RRR  Pulmonary:  no respiratory distress  Abdomen:  soft, non-tender, non-distended    Neurologic  Mental Status:  fully alert and oriented to time, place and situation  Cranial Nerves:  visual fields intact, PERRL, EOMI with normal smooth pursuit  Motor:  course resting and intentional tremors seen in bilateral upper extremities and right lower extremity  Sensory:  intact/symmetric to light touch and pin prick throughout upper and lower extremities  No drift sen in bilateral upper extremities  Lower extremities significant for drift bilaterally  Coordination:  dysmetria noted bilaterally    Pre-procedure National Institutes of Health Stroke Scale:   Not applicable    LABS  (most recent Cr, BUN, GFR, PLT, INR, PTT within the past 7 days):  No lab results found in last 7 days.     Platelet Function P2Y12 (PRU):  Not applicable      ASSESSMENT: Known history of NPH with planned surgical shunt placement and a previously elevated cell count to 63.    PLAN: LP to check for cell counts prior to planned surgical shunt placement         PRE-PROCEDURE SEDATION ASSESSMENT     Pre-Procedure Sedation Assessment done at 0900    Expected Level:  Moderate Sedation    Indication:  Sedation is required to allow for neurointerventional procedure.    Consent obtained from patient after discussing the risks, benefits and alternatives.     PO Intake:  Appropriately NPO for procedure    ASA Class:  Class 2 - MILD SYSTEMIC DISEASE, NO ACUTE PROBLEMS, NO FUNCTIONAL LIMITATIONS.    Mallampati:  Grade 2:  Soft palate, base of uvula, tonsillar pillars, and portion of posterior pharyngeal wall visible    History and physical reviewed and no updates needed. I have reviewed the lab findings, diagnostic data, medications, and the plan for sedation. I have determined this patient to be an appropriate candidate for the planned sedation and procedure and have reassessed the patient IMMEDIATELY PRIOR to sedation and procedure.    Patient was discussed with the Attending, Dr. Rosales, who agrees with the plan.    Verónica Oro MD   Pager: [phone]

## 2024-05-01 NOTE — PROGRESS NOTES
Pt return to 2A from IR at 1200 s/p LP. Mid lower back LP incision site CDI, no hematoma.  VSS. A&O. Denies pain. Tolerating food intake sitting upright for short periord, otherwise bedrest for 1 hour lying flat. Wife Jono yvonne.

## 2024-05-01 NOTE — DISCHARGE INSTRUCTIONS
[facility]  Going Home after Sedation    For 24 hours:  An adult should stay with you.  Relax and take it easy. Lay flat for the remainder of the day today.  DO NOT make any important legal decisions.  DO NOT drive or operate machines at home or at work.  Resume your regular diet and drink plenty of fluids.    CALL THE PHYSICIAN IF:  You develop nausea or vomiting  You develop hives or a rash or any unexplained itching    [facility] INTERVENTIONAL RADIOLOGY DEPARTMENT        Procedure Physician:   Dr. Rosales, Dr. Oro, Dr. Peters      Date of procedure:May 1, 2024          Telephone numbers:     [phone]      Monday-Friday 7:30 am to 4:00 pm                                              [phone]       After 4:00 pm Monday-Friday, Weekends  & Holidays. Ask the  to contact the Neuro Interventional doctor on call. Someone is  available 24 hours/day        [facility] toll free number: [phone]  Monday-Friday 8:00 am to 4:30 pm

## 2024-05-01 NOTE — IR NOTE
Patient Name: Perry Soni  Medical Record Number: [id number]  Today's Date: 5/1/2024    Procedure: Lumbar puncture   Proceduralist: Dr. Rosales, Dr. Peters, Dr. Oro  Pathology present: N/A    Procedure Start: 1111  Procedure end: 1143  Sedation medications administered: Midazolam 2 mg, Fentanyl 100 mcg     Report given to: JOSSELYN Marquez 2A  : N/A    Other Notes: Pt arrived to IR room 3 from . Consent reviewed. Pt denies any questions or concerns regarding procedure. Pt positioned lateral, left side down, and monitored per protocol.     4 tubes of CSF obtained and sent to lab as ordered.   Site dressed and cleansed per protocol. Tegaferm placed over site. Hemostasis achieved.    Patient to be on flat bedrest for 1 hour.    Pt tolerated procedure without any noted complications. Pt transferred back to .

## 2024-05-02 ENCOUNTER — PATIENT OUTREACH (OUTPATIENT)
Dept: NEUROLOGY | Facility: CLINIC | Age: 84
End: 2024-05-02
Payer: COMMERCIAL

## 2024-05-02 DIAGNOSIS — R83.9 CSF ABNORMAL: Primary | ICD-10-CM

## 2024-05-02 LAB
PATH REPORT.COMMENTS IMP SPEC: NORMAL
PATH REPORT.COMMENTS IMP SPEC: NORMAL
PATH REPORT.FINAL DX SPEC: NORMAL
PATH REPORT.GROSS SPEC: NORMAL
PATH REPORT.RELEVANT HX SPEC: NORMAL

## 2024-05-02 NOTE — PROGRESS NOTES
Endovascular Surgical Neuroradiology - Post Discharge Call    Admit: 5/1/24    Discharge: 5/1/24    Facility: [facility]    MD/Service: Dr. Rosales/VIJAY    Pre-Procedure Diagnosis:  NPH    Post-Procedure Diagnosis:  same     Procedure(s):   Fluoroscopic Lumbar Puncture with collection of CSF samples     Findings: 17 ml of clear spinal fluid collected      Plan: Pending CSF lab results, will be checked prior to lumbar drain procedure.    Denies bleeding, drainage, pain, swelling, warmth, redness at puncture site. Eating and drinking ok. Patient states his gait has improved a little bit and he is feeling really good. He states that his experience yesterday was very different than his last LP at another facility and he thinks it was more thorough, less painful and overall had a really good experience. Most likely going to get patient set up for a phone call with Dr. Rosales next week, after lab results come back to determine follow-up plan. Patient aware of the plan and in agreement. Patient has my contact information and was encouraged to call with questions/concerns.   Jackie Mahoney RN 5/2/2024 9:41 AM

## 2024-05-03 LAB
HSV1 DNA CSF QL NAA+PROBE: NOT DETECTED
HSV2 DNA CSF QL NAA+PROBE: NOT DETECTED

## 2024-05-06 LAB
BACTERIA CSF CULT: NO GROWTH
GRAM STAIN RESULT: NORMAL
GRAM STAIN RESULT: NORMAL

## 2024-05-07 ENCOUNTER — TELEPHONE (OUTPATIENT)
Dept: NEUROLOGY | Facility: CLINIC | Age: 84
End: 2024-05-07
Payer: COMMERCIAL

## 2024-05-07 DIAGNOSIS — D72.9 CSF PLEOCYTOSIS: Primary | ICD-10-CM

## 2024-05-08 ENCOUNTER — TELEPHONE (OUTPATIENT)
Dept: NEUROLOGY | Facility: CLINIC | Age: 84
End: 2024-05-08
Payer: COMMERCIAL

## 2024-05-08 NOTE — TELEPHONE ENCOUNTER
Called patient to check in on him per call from Dr. Garcia, his labs came back with diptheroids. Want to make sure he is doing okay. If any signs or symptoms of Meningitis or encephalitis send him to ED. If patient is doing well, he can proceed with appointment on 5/28/24 with Jairo as scheduled. LVMM for patient letting him know some labs came back and I wanted to check in on him and see if he was experiencing any neck pain, headache or fever. Asked that he call me back as soon as he is able. Left my contact information. Per Dr. Garcia she also got his voicemail this morning. Will try him again this afternoon.   Jackie Mahoney RN 5/8/2024 11:53 AM    Called patient back to discuss, got his voicemail. Tried to contact patient wife, her voicemail box was full. Will try again later.   Jackie Mahoney RN 5/8/2024 1:48 PM    Called patient back to check in. LVMM for patient letting him know I was calling to check in. I let patient know we got some lab results back and if he is experiencing a headache, neck pain or fever that he does need to present to the nearest ED due to lab results that came back. Left my contact information and requested patient call me back.   Jackie Mahoney RN 5/8/2024 4:26 PM

## 2024-05-15 LAB — BACTERIA CSF CULT: ABNORMAL

## 2024-05-17 DIAGNOSIS — G91.2 IDIOPATHIC NORMAL PRESSURE HYDROCEPHALUS (INPH) (H): Primary | ICD-10-CM

## 2024-05-19 DIAGNOSIS — G91.2 IDIOPATHIC NORMAL PRESSURE HYDROCEPHALUS (INPH) (H): Primary | ICD-10-CM

## 2024-05-19 DIAGNOSIS — R83.9 CSF ABNORMAL: ICD-10-CM

## 2024-05-19 DIAGNOSIS — I67.1 CEREBROVASCULAR DURAL AV FISTULA: ICD-10-CM

## 2024-05-28 ENCOUNTER — OFFICE VISIT (OUTPATIENT)
Dept: NEUROLOGY | Facility: CLINIC | Age: 84
End: 2024-05-28
Payer: COMMERCIAL

## 2024-05-28 VITALS
DIASTOLIC BLOOD PRESSURE: 74 MMHG | HEART RATE: 91 BPM | SYSTOLIC BLOOD PRESSURE: 124 MMHG | HEIGHT: 70 IN | WEIGHT: 146 LBS | BODY MASS INDEX: 20.9 KG/M2

## 2024-05-28 DIAGNOSIS — G20.C PARKINSONISM, UNSPECIFIED PARKINSONISM TYPE (H): Primary | ICD-10-CM

## 2024-05-28 DIAGNOSIS — R27.8 OTHER LACK OF COORDINATION: ICD-10-CM

## 2024-05-28 DIAGNOSIS — R41.3 MEMORY CHANGE: ICD-10-CM

## 2024-05-28 PROCEDURE — 99417 PROLNG OP E/M EACH 15 MIN: CPT | Performed by: INTERNAL MEDICINE

## 2024-05-28 PROCEDURE — 99205 OFFICE O/P NEW HI 60 MIN: CPT | Performed by: INTERNAL MEDICINE

## 2024-05-28 RX ORDER — CARBIDOPA AND LEVODOPA 25; 100 MG/1; MG/1
1 TABLET ORAL 3 TIMES DAILY
Qty: 90 TABLET | Refills: 6 | Status: SHIPPED | OUTPATIENT
Start: 2024-05-28 | End: 2024-05-29

## 2024-05-28 NOTE — PATIENT INSTRUCTIONS
Take 1/2 tablet three times a day    After 1 week increase to 1 tablet three times a day       Pt was brought for cardiac arrest, downtime 1.5 hours ago, EMS arrived on the scene at 19:30, was found in Asystole, went to pulseless V-tach, then sinus with ROSC, V-tach again, sinus once again. Epi x 5, defibrillation x 2 PTA, and Amiodarone 300 mg all was given PTA. Pt arrived to ED intubated with sinus rhythm.

## 2024-05-28 NOTE — LETTER
5/28/2024         RE: Perry Soni  [address] [place] [address]        Dear Colleague,    Thank you for referring your patient, Perry Soni, to the [facility] NEUROLOGY [facility] [place]. Please see a copy of my visit note below.    [facility] Neurology Consultation    Perry Soni MRN# [id number]   Age: 83 year old YOB: 1940     Requesting physician: No ref. provider found  Diego Barcenas     Reason for Consultation: imbalance, tremors, memory problems      History of Presenting Symptoms:   Perry Soni is a 83 year old male who presents today for evaluation of imbalance, tremors, memory problems.     Patient found himself having problems walking 2 years. He was shuffling. His walking has worsened since then. He has also developed urinary urgency and incontinence. He notes problems with remembering recent things and he had neuropsychology testing to evaluate this. Family hasn't noticed a big change in memory. Patient notes some problems with retrieving recent memories.     Over concern for NPH patient had a CSF tap test. Patient reports that he felt better after CSF tap test.     Patient has had episodes of confusion. This includes mixing up words and slurring. He also had an episode of left sided numbness. He did not have a headache during these episodes.     He notices a tremor in both hands. This started about 8-9 years ago after AVM surgery. It has gotten significantly worse in the last few years.      There is no history of acting out dreams. He denies any history of hearing voices.       Past Medical History:     Patient Active Problem List   Diagnosis     Cerebrovascular dural AV fistula     Dural arteriovenous fistula     Past Medical History:   Diagnosis Date     AVM (arteriovenous malformation)      Depression      Hyperlipidemia         Past Surgical History:     Past Surgical History:   Procedure Laterality Date     abd hernia repair       CRANIOTOMY, REPAIR  ARTERIOVENOUS MALFORMATION, COMBINED Left 2015    Procedure: COMBINED CRANIOTOMY, REPAIR ARTERIOVENOUS MALFORMATION;  Surgeon: Ember Rosales MD;  Location: [facility] OR     HERNIA REPAIR       IR LUMBAR PUNCTURE  2023        Social History:     Social History     Tobacco Use     Smoking status: Former     Current packs/day: 0.00     Average packs/day: 1 pack/day for 15.0 years (15.0 ttl pk-yrs)     Types: Cigarettes     Start date: 1966     Quit date: 1981     Years since quittin.7     Smokeless tobacco: Never   Substance Use Topics     Alcohol use: No     Drug use: No        Family History:   No family history on file.     Medications:     Current Outpatient Medications   Medication Sig Dispense Refill     acetaminophen 650 MG TABS Take 650 mg by mouth every 6 hours as needed for mild pain 100 tablet      acyclovir (ZOVIRAX) 5 % ointment Apply 0.5 inches topically 5 times daily. PRN       amLODIPine (NORVASC) 5 MG tablet Take 5 mg by mouth daily       aspirin (ASA) 81 MG chewable tablet Take 81 mg by mouth       atorvastatin (LIPITOR) 40 MG tablet Take 40 mg by mouth every evening       cephALEXin (KEFLEX) 500 MG capsule        clopidogrel (PLAVIX) 75 MG tablet Take 1 tablet by mouth daily       diphenhydrAMINE-acetaminophen (TYLENOL PM EXTRA STRENGTH)  MG tablet Take 1 tablet by mouth nightly as needed       FLUoxetine HCl (PROZAC PO) Take 10 mg by mouth daily        fluticasone (FLONASE) 50 MCG/ACT nasal spray 2 sprays       simvastatin (ZOCOR) 20 MG tablet Take 20 mg by mouth At Bedtime.       tamsulosin (FLOMAX) 0.4 MG capsule Take 2 capsules by mouth daily       No current facility-administered medications for this visit.        Allergies:     Allergies   Allergen Reactions     Wellbutrin [Bupropion Hcl] Anxiety        Review of Systems:   As above     Physical Exam:   Vitals: /74 (BP Location: Right arm, Patient Position: Sitting, Cuff Size: Adult Regular)    "Pulse 91   Ht 1.778 m (5' 10\")   Wt 66.2 kg (146 lb)   BMI 20.95 kg/m     General: Seated comfortably in no acute distress.  HEENT: Optic discs sharp on funduscopic exam.   Lungs: breathing comfortably  Neurologic:     Mental Status: Fully alert, attentive and oriented. Grossly intact memory and fund of knowledge. Language normal, speech clear and fluent, no paraphasic errors. 3/5 on delayed recall (5/5 with category cue). Intact cube copy and clock drawing.      Cranial Nerves: Visual fields intact. PERRL. EOMI. Facial sensation intact/symmetric. Facial movements symmetric. Hearing not formally tested but intact to conversation. Palate elevation symmetric, uvula midline. No dysarthria. Shoulder shrug strong bilaterally. Tongue protrusion midline.     Motor: Action > resting tremors in bilateral upper extremities. Muscle tone with cogwheel rigidity in bilateral upper extremities. Decrement in bilateral rapid finger tapping. Strength 5/5 throughout upper and lower extremities.     Deep Tendon Reflexes: 2+/symmetric throughout upper and lower extremities. No clonus. Toes downgoing bilaterally.     Sensory: Intact/symmetric to light touch throughout upper and lower extremities. Negative Romberg.      Coordination: Finger-nose-finger and heel-shin intact without dysmetria.      Gait: Mild decrease in arm swing and stride length. Drags right leg. Tremors in bilateral arms with walking. Moderate issues with tandem gait.          Data: Pertinent prior to visit   Imaging:  MRA head 1/2024  IMPRESSION:   1. No evidence for large vessel occlusion or aneurysm.   2. New moderate right-sided and mild left-sided narrowing of the P3 segments of the posterior cerebral arteries bilaterally which may be accentuated by hypoplasia.     MRA neck 1/2024  IMPRESSION:   1. Mild short segment stenosis of the origin of the right vertebral artery unchanged. Carotid arterial systems otherwise patent and nonstenotic bilaterally.   2. " Moderate to severe short segment stenosis of the right vertebral artery origin unchanged. Left vertebral system patent and nonstenotic.   3. No evidence for dissection.     MRI brain 1/2024  IMPRESSION:    1. 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   2. Stable ventriculomegaly.   3. Stable moderate periventricular and subcortical white matter T2/FLAIR signal hyperintensities, commonly seen with chronic microvascular ischemia.         TTE 3/2024  CONCLUSIONS   Global and regional left ventricular function is normal.   Left ventricular ejection fraction is visually estimated at 60%.   Mild dilation of the aorta is present involving the sinuses of   Valsalva. (Maximal dimension 4.5 cm).     MRI c/t 12/2023  IMPRESSION:    1. Subacute compression fracture along the superior endplate of T12 with less than 20% loss of vertebral body height.   2. Severe right neural foraminal stenosis at C3-4. Correlate with right C4 radicular symptoms.   3. Severe left neural foraminal stenosis with effacement left lateral recess at C5-6. Correlate with left C6 radicular symptoms.   4. Additional degenerative changes as above.     Procedures:  14 day ziopatch   Summary:   - Zio is unremarkable   - No atrial fibrillation detected.     Laboratory:  CSF: 61 WBC, protein 61, normal glucose, cytology negative (5/2024)  CSF: 63 WBC (12/2023)         Assessment and Plan:   Assessment:  Perry Soni is a 83 year old male who presents today for evaluation of imbalance, tremors, memory problems. Symptoms have developed over the last year. He reports some improvement after CSF tap test. MRI brain does show ventriculomegaly, but does not have class features of NPH (no acute callosal angle, DESH, tight convexity). I can't rule out NPH, but based on examination alone, I am more concerned about parkinsonism, possibly atypical form. We discussed a Sinemet trial to see if this helps  symptoms.     Prior lumbar punctures showed significant elevation on WBC. I cannot clearly correlate this finding to patient's symptoms. Prior MRI brain did not show a clear explanation of this.  We discussed repeating lumbar puncture to evaluate for additional causes of elevated CSF WBC.      Plan:  - Sinemet 1 tablet (25/100) TID  - Repeat lumbar puncture with CSF cell count with diff, protein, glucose, cytology, cytometry, Garcia encephalopathy panel, oligoclonal bands, lyme Abs, meningitis/encephalitis panel    Follow up in Neurology clinic pending above    Benoit Gill MD   of Neurology  [facility]      The total time of this encounter today amounted to 82 minutes. This time included time spent with the patient, prep work, ordering tests, and performing post visit documentation.      Again, thank you for allowing me to participate in the care of your patient.        Sincerely,        Benoit Gill MD

## 2024-05-28 NOTE — PROGRESS NOTES
[facility] Neurology Consultation    Perry Soni MRN# [id number]   Age: 83 year old YOB: 1940     Requesting physician: No ref. provider found  Diego Barcenas     Reason for Consultation: imbalance, tremors, memory problems      History of Presenting Symptoms:   Perry Soni is a 83 year old male who presents today for evaluation of imbalance, tremors, memory problems.     Patient found himself having problems walking 2 years. He was shuffling. His walking has worsened since then. He has also developed urinary urgency and incontinence. He notes problems with remembering recent things and he had neuropsychology testing to evaluate this. Family hasn't noticed a big change in memory. Patient notes some problems with retrieving recent memories.     Over concern for NPH patient had a CSF tap test. Patient reports that he felt better after CSF tap test.     Patient has had episodes of confusion. This includes mixing up words and slurring. He also had an episode of left sided numbness. He did not have a headache during these episodes.     He notices a tremor in both hands. This started about 8-9 years ago after AVM surgery. It has gotten significantly worse in the last few years.      There is no history of acting out dreams. He denies any history of hearing voices.       Past Medical History:     Patient Active Problem List   Diagnosis    Cerebrovascular dural AV fistula    Dural arteriovenous fistula     Past Medical History:   Diagnosis Date    AVM (arteriovenous malformation)     Depression     Hyperlipidemia         Past Surgical History:     Past Surgical History:   Procedure Laterality Date    abd hernia repair      CRANIOTOMY, REPAIR ARTERIOVENOUS MALFORMATION, COMBINED Left 4/29/2015    Procedure: COMBINED CRANIOTOMY, REPAIR ARTERIOVENOUS MALFORMATION;  Surgeon: Ember Rosales MD;  Location: [facility] OR    HERNIA REPAIR      IR LUMBAR PUNCTURE  12/27/2023        Social History:     Social History  "    Tobacco Use    Smoking status: Former     Current packs/day: 0.00     Average packs/day: 1 pack/day for 15.0 years (15.0 ttl pk-yrs)     Types: Cigarettes     Start date: 1966     Quit date: 1981     Years since quittin.7    Smokeless tobacco: Never   Substance Use Topics    Alcohol use: No    Drug use: No        Family History:   No family history on file.     Medications:     Current Outpatient Medications   Medication Sig Dispense Refill    acetaminophen 650 MG TABS Take 650 mg by mouth every 6 hours as needed for mild pain 100 tablet     acyclovir (ZOVIRAX) 5 % ointment Apply 0.5 inches topically 5 times daily. PRN      amLODIPine (NORVASC) 5 MG tablet Take 5 mg by mouth daily      aspirin (ASA) 81 MG chewable tablet Take 81 mg by mouth      atorvastatin (LIPITOR) 40 MG tablet Take 40 mg by mouth every evening      cephALEXin (KEFLEX) 500 MG capsule       clopidogrel (PLAVIX) 75 MG tablet Take 1 tablet by mouth daily      diphenhydrAMINE-acetaminophen (TYLENOL PM EXTRA STRENGTH)  MG tablet Take 1 tablet by mouth nightly as needed      FLUoxetine HCl (PROZAC PO) Take 10 mg by mouth daily       fluticasone (FLONASE) 50 MCG/ACT nasal spray 2 sprays      simvastatin (ZOCOR) 20 MG tablet Take 20 mg by mouth At Bedtime.      tamsulosin (FLOMAX) 0.4 MG capsule Take 2 capsules by mouth daily       No current facility-administered medications for this visit.        Allergies:     Allergies   Allergen Reactions    Wellbutrin [Bupropion Hcl] Anxiety        Review of Systems:   As above     Physical Exam:   Vitals: /74 (BP Location: Right arm, Patient Position: Sitting, Cuff Size: Adult Regular)   Pulse 91   Ht 1.778 m (5' 10\")   Wt 66.2 kg (146 lb)   BMI 20.95 kg/m     General: Seated comfortably in no acute distress.  HEENT: Optic discs sharp on funduscopic exam.   Lungs: breathing comfortably  Neurologic:     Mental Status: Fully alert, attentive and oriented. Grossly intact memory and " fund of knowledge. Language normal, speech clear and fluent, no paraphasic errors. 3/5 on delayed recall (5/5 with category cue). Intact cube copy and clock drawing.      Cranial Nerves: Visual fields intact. PERRL. EOMI. Facial sensation intact/symmetric. Facial movements symmetric. Hearing not formally tested but intact to conversation. Palate elevation symmetric, uvula midline. No dysarthria. Shoulder shrug strong bilaterally. Tongue protrusion midline.     Motor: Action > resting tremors in bilateral upper extremities. Muscle tone with cogwheel rigidity in bilateral upper extremities. Decrement in bilateral rapid finger tapping. Strength 5/5 throughout upper and lower extremities.     Deep Tendon Reflexes: 2+/symmetric throughout upper and lower extremities. No clonus. Toes downgoing bilaterally.     Sensory: Intact/symmetric to light touch throughout upper and lower extremities. Negative Romberg.      Coordination: Finger-nose-finger and heel-shin intact without dysmetria.      Gait: Mild decrease in arm swing and stride length. Drags right leg. Tremors in bilateral arms with walking. Moderate issues with tandem gait.          Data: Pertinent prior to visit   Imaging:  MRA head 1/2024  IMPRESSION:   1. No evidence for large vessel occlusion or aneurysm.   2. New moderate right-sided and mild left-sided narrowing of the P3 segments of the posterior cerebral arteries bilaterally which may be accentuated by hypoplasia.     MRA neck 1/2024  IMPRESSION:   1. Mild short segment stenosis of the origin of the right vertebral artery unchanged. Carotid arterial systems otherwise patent and nonstenotic bilaterally.   2. Moderate to severe short segment stenosis of the right vertebral artery origin unchanged. Left vertebral system patent and nonstenotic.   3. No evidence for dissection.     MRI brain 1/2024  IMPRESSION:    1. Acute or early subacute lacunar infarct involving the left parietal lobe measuring up to 1.6 cm in  length.   2. Stable ventriculomegaly.   3. Stable moderate periventricular and subcortical white matter T2/FLAIR signal hyperintensities, commonly seen with chronic microvascular ischemia.         TTE 3/2024  CONCLUSIONS   Global and regional left ventricular function is normal.   Left ventricular ejection fraction is visually estimated at 60%.   Mild dilation of the aorta is present involving the sinuses of   Valsalva. (Maximal dimension 4.5 cm).     MRI c/t 12/2023  IMPRESSION:    1. Subacute compression fracture along the superior endplate of T12 with less than 20% loss of vertebral body height.   2. Severe right neural foraminal stenosis at C3-4. Correlate with right C4 radicular symptoms.   3. Severe left neural foraminal stenosis with effacement left lateral recess at C5-6. Correlate with left C6 radicular symptoms.   4. Additional degenerative changes as above.     Procedures:  14 day ziopatch   Summary:   - Zio is unremarkable   - No atrial fibrillation detected.     Laboratory:  CSF: 61 WBC, protein 61, normal glucose, cytology negative (5/2024)  CSF: 63 WBC (12/2023)         Assessment and Plan:   Assessment:  Perry Soni is a 83 year old male who presents today for evaluation of imbalance, tremors, memory problems. Symptoms have developed over the last year. He reports some improvement after CSF tap test. MRI brain does show ventriculomegaly, but does not have class features of NPH (no acute callosal angle, DESH, tight convexity). I can't rule out NPH, but based on examination alone, I am more concerned about parkinsonism, possibly atypical form. We discussed a Sinemet trial to see if this helps symptoms.     Prior lumbar punctures showed significant elevation on WBC. I cannot clearly correlate this finding to patient's symptoms. Prior MRI brain did not show a clear explanation of this.  We discussed repeating lumbar puncture to evaluate for additional causes of elevated CSF WBC.      Plan:  - Sinemet 1  tablet (25/100) TID  - Repeat lumbar puncture with CSF cell count with diff, protein, glucose, cytology, cytometry, Garcia encephalopathy panel, oligoclonal bands, lyme Abs, meningitis/encephalitis panel    Follow up in Neurology clinic pending above    Benoit Gill MD   of Neurology  [facility]      The total time of this encounter today amounted to 82 minutes. This time included time spent with the patient, prep work, ordering tests, and performing post visit documentation.

## 2024-05-28 NOTE — NURSING NOTE
"Perry Soni's goals for this visit include:   Chief Complaint   Patient presents with    New Patient       He requests these members of his care team be copied on today's visit information: yes    PCP: Diego Barcenas    Referring Provider:  Diego Barcenas MD  [address] [place] [address]    /74 (BP Location: Right arm, Patient Position: Sitting, Cuff Size: Adult Regular)   Pulse 91   Ht 1.778 m (5' 10\")   Wt 66.2 kg (146 lb)   BMI 20.95 kg/m      Do you need any medication refills at today's visit? No  ARSENIO Rodriguez, CMA ([facility])      "

## 2024-05-29 ENCOUNTER — TELEPHONE (OUTPATIENT)
Dept: NEUROSURGERY | Facility: CLINIC | Age: 84
End: 2024-05-29

## 2024-05-29 ENCOUNTER — TELEPHONE (OUTPATIENT)
Dept: NEUROLOGY | Facility: CLINIC | Age: 84
End: 2024-05-29
Payer: COMMERCIAL

## 2024-05-29 DIAGNOSIS — I67.1 CEREBROVASCULAR DURAL AV FISTULA: Primary | ICD-10-CM

## 2024-05-29 DIAGNOSIS — G20.C PARKINSONISM, UNSPECIFIED PARKINSONISM TYPE (H): ICD-10-CM

## 2024-05-29 RX ORDER — CARBIDOPA AND LEVODOPA 25; 100 MG/1; MG/1
1 TABLET ORAL 3 TIMES DAILY
Qty: 270 TABLET | Refills: 1 | Status: SHIPPED | OUTPATIENT
Start: 2024-05-29

## 2024-05-29 NOTE — TELEPHONE ENCOUNTER
Medication: carbidopa-levodopa (SINEMET)  MG tablet   Sig:  Take 1 tablet by mouth 3 times daily   Date last written: 5/28/24   Dispensed amount: 90 Tabs   Refills: 6    Requested Pharmacy: [id number] - [place], [place] - [address] AT [facility] [address]     Pt's last office visit: 5/28/24  Next scheduled office visit: 6/26/24      Patient is requesting authorization for a 90 Day supply.        Per the RN/LPN medication refill protocol, writer is unable to refill this request.      MELODY Rodriguez., ANTIONETTE ([facility])

## 2024-05-29 NOTE — PROGRESS NOTES
Per Dr. Rosales and Dr. Gill to complete another LP. Order entered.   Jackie Mahoney RN 5/29/2024 3:10 PM

## 2024-06-03 ENCOUNTER — TELEPHONE (OUTPATIENT)
Dept: NEUROSURGERY | Facility: CLINIC | Age: 84
End: 2024-06-03
Payer: COMMERCIAL

## 2024-06-03 NOTE — TELEPHONE ENCOUNTER
I called patient in regards to surgery with Dr. Adebayo Rosales. I was unable to reach patient, but a voicemail and a call back number were left on patients voicemail.        Chevy Aguirre on 6/3/2024 at 2:43 PM

## 2024-06-04 NOTE — TELEPHONE ENCOUNTER
I called patient in regards to IR procedure with Dr. Rosales    Procedure Date: 6/17/2024    Arrival: 0830    Surgeon: Dr. Roslaes      Notes:         Chevy Aguirre on 6/4/2024 at 3:21 PM

## 2024-06-05 ENCOUNTER — PATIENT OUTREACH (OUTPATIENT)
Dept: NEUROLOGY | Facility: CLINIC | Age: 84
End: 2024-06-05
Payer: COMMERCIAL

## 2024-06-05 NOTE — PROGRESS NOTES
Informed patient of procedure instructions. Confirmed date and time. Patient verbalized understanding. All questions answered. Patient instructions sent via mail. Patient has my contact information and was encouraged to call with questions/concerns.   Jackie Mahoney RN 6/5/2024 10:12 AM

## 2024-06-05 NOTE — PATIENT INSTRUCTIONS
You are scheduled for a Lumbar Puncture with Dr. Rosales on 6/17/24. Arrival Time: 8:30 AM Procedure Time: 10:00 AM.     Please follow these instructions:     * Check in at the Gold Waiting Room at the [facility]. The address is [address] [place] [address]. The phone number is [phone].      * Nothing to eat for 8 hours prior to arrival time. You may drink clear liquids (includes water, Jell-O, clear broth, apple juice or any non-carbonated beverage that you can see through) up until 2 hours prior to arrival time.      * You may take your medications with a sip of water the morning of the procedure. Stay on Aspirin.      * You will be discharged the same day. You must have a  home and someone that can stay with you through the night.      PLEASE NOTE our COVID-19 visitors policy: Adult surgical and procedural patients can have two visitors throughout the surgery process. The two visitors may include children of any age; please note, children cannot stay in the waiting room alone.     All discharge instructions will be given to the  or volunteer. Documentation for the post-operative plan will be given to the patient and . Patients are required to have someone to stay with them for 24 hours after their procedure.     If you have questions regarding your procedure, please contact me at [phone], option 1.     If you need to cancel, reschedule or have procedure scheduling related questions, please call [facility] IR Procedure Scheduling at [phone].     Thank you,  Sunni Adams, RN, CNRN, SCRN  Jackie Mahoney, RN, BSN  Stroke & Neuroendovascular Care Coordinator

## 2024-06-11 ENCOUNTER — TELEPHONE (OUTPATIENT)
Dept: NEUROLOGY | Facility: CLINIC | Age: 84
End: 2024-06-11
Payer: COMMERCIAL

## 2024-06-11 NOTE — TELEPHONE ENCOUNTER
Called and left voicemail for patient to call back. Patient is scheduled for 6/26 currently, but lumbar puncture results will not be available then. Writer would like to push appointment out to 7/16, so there is time for the results to come in. Asked patient to call back to discuss.

## 2024-06-12 NOTE — TELEPHONE ENCOUNTER
Called and spoke with patient's wife. Explained that all the lumbar puncture results may not be in at that time, and asked to push the appointment back to roughly a month after lumbar puncture. Explained that we may need to push it back further if the results are not in. She stated understanding and disappointment that it would take so long for the tests to get in. Writer let her know that if there are any urgent findings, Dr. Gill will contact them. She stated understanding.

## 2024-06-12 NOTE — TELEPHONE ENCOUNTER
[facility] Call Center    Phone Message    May a detailed message be left on voicemail: yes     Reason for Call: patient called would like to speak to JEVON Garcia regarding his appointment. Per patient his appointment has been pushed back so many times now and he would like to speak to nurse before changing anything.     Action Taken: Message routed to:  Adult Clinics: Neurology p [id number]    Travel Screening: Not Applicable     Date of Service:

## 2024-06-17 ENCOUNTER — APPOINTMENT (OUTPATIENT)
Dept: INTERVENTIONAL RADIOLOGY/VASCULAR | Facility: CLINIC | Age: 84
End: 2024-06-17
Attending: NEUROLOGICAL SURGERY
Payer: COMMERCIAL

## 2024-06-17 ENCOUNTER — HOSPITAL ENCOUNTER (OUTPATIENT)
Facility: CLINIC | Age: 84
Discharge: HOME | End: 2024-06-17
Attending: NEUROLOGICAL SURGERY | Admitting: NEUROLOGICAL SURGERY
Payer: COMMERCIAL

## 2024-06-17 ENCOUNTER — APPOINTMENT (OUTPATIENT)
Dept: MEDSURG UNIT | Facility: CLINIC | Age: 84
End: 2024-06-17
Attending: NEUROLOGICAL SURGERY
Payer: COMMERCIAL

## 2024-06-17 VITALS
WEIGHT: 148.37 LBS | HEART RATE: 83 BPM | TEMPERATURE: 97.8 F | RESPIRATION RATE: 18 BRPM | BODY MASS INDEX: 21.29 KG/M2 | DIASTOLIC BLOOD PRESSURE: 112 MMHG | OXYGEN SATURATION: 99 % | SYSTOLIC BLOOD PRESSURE: 125 MMHG

## 2024-06-17 DIAGNOSIS — I67.1 CEREBROVASCULAR DURAL AV FISTULA: ICD-10-CM

## 2024-06-17 LAB
ANION GAP SERPL CALCULATED.3IONS-SCNC: 10 MMOL/L (ref 7–15)
APPEARANCE CSF: CLEAR
APTT PPP: 33 SECONDS (ref 22–38)
BUN SERPL-MCNC: 19.3 MG/DL (ref 8–23)
C GATTII+NEOFOR DNA CSF QL NAA+NON-PROBE: NEGATIVE
CALCIUM SERPL-MCNC: 8.7 MG/DL (ref 8.8–10.2)
CHLORIDE SERPL-SCNC: 106 MMOL/L (ref 98–107)
CMV DNA CSF QL NAA+NON-PROBE: NEGATIVE
COLOR CSF: COLORLESS
CREAT SERPL-MCNC: 0.93 MG/DL (ref 0.67–1.17)
DEPRECATED HCO3 PLAS-SCNC: 21 MMOL/L (ref 22–29)
E COLI K1 AG CSF QL: NEGATIVE
EGFRCR SERPLBLD CKD-EPI 2021: 82 ML/MIN/1.73M2
ERYTHROCYTE [DISTWIDTH] IN BLOOD BY AUTOMATED COUNT: 14.6 % (ref 10–15)
EV RNA SPEC QL NAA+PROBE: NEGATIVE
GLUCOSE CSF-MCNC: 38 MG/DL (ref 40–70)
GLUCOSE SERPL-MCNC: 96 MG/DL (ref 70–99)
GP B STREP DNA CSF QL NAA+NON-PROBE: NEGATIVE
HAEM INFLU DNA CSF QL NAA+NON-PROBE: NEGATIVE
HCT VFR BLD AUTO: 42.7 % (ref 40–53)
HGB BLD-MCNC: 14.2 G/DL (ref 13.3–17.7)
HHV6 DNA CSF QL NAA+NON-PROBE: NEGATIVE
HSV1 DNA CSF QL NAA+NON-PROBE: NEGATIVE
HSV2 DNA CSF QL NAA+NON-PROBE: NEGATIVE
INR BLD: 1 (ref 2–3)
INR PPP: 1.19 (ref 0.85–1.15)
L MONOCYTOG DNA CSF QL NAA+NON-PROBE: NEGATIVE
LYMPH ABN NFR CSF MANUAL: 58 %
MCH RBC QN AUTO: 30.4 PG (ref 26.5–33)
MCHC RBC AUTO-ENTMCNC: 33.3 G/DL (ref 31.5–36.5)
MCV RBC AUTO: 91 FL (ref 78–100)
MONOS+MACROS NFR CSF MANUAL: 11 %
N MEN DNA CSF QL NAA+NON-PROBE: NEGATIVE
NEUTROPHILS NFR CSF MANUAL: 31 %
PARECHOVIRUS A RNA CSF QL NAA+NON-PROBE: NEGATIVE
PLATELET # BLD AUTO: 174 10E3/UL (ref 150–450)
POTASSIUM SERPL-SCNC: 3.7 MMOL/L (ref 3.4–5.3)
PROT CSF-MCNC: 77.3 MG/DL (ref 15–45)
RBC # BLD AUTO: 4.67 10E6/UL (ref 4.4–5.9)
RBC # CSF MANUAL: 7 /UL (ref 0–2)
S PNEUM DNA CSF QL NAA+NON-PROBE: NEGATIVE
SODIUM SERPL-SCNC: 137 MMOL/L (ref 135–145)
TUBE # CSF: 4
VZV DNA CSF QL NAA+NON-PROBE: NEGATIVE
WBC # BLD AUTO: 6.2 10E3/UL (ref 4–11)
WBC # CSF MANUAL: 65 /UL (ref 0–5)

## 2024-06-17 PROCEDURE — 85027 COMPLETE CBC AUTOMATED: CPT | Performed by: STUDENT IN AN ORGANIZED HEALTH CARE EDUCATION/TRAINING PROGRAM

## 2024-06-17 PROCEDURE — 80048 BASIC METABOLIC PNL TOTAL CA: CPT | Performed by: STUDENT IN AN ORGANIZED HEALTH CARE EDUCATION/TRAINING PROGRAM

## 2024-06-17 PROCEDURE — 99152 MOD SED SAME PHYS/QHP 5/>YRS: CPT | Performed by: NEUROLOGICAL SURGERY

## 2024-06-17 PROCEDURE — 88185 FLOWCYTOMETRY/TC ADD-ON: CPT | Performed by: STUDENT IN AN ORGANIZED HEALTH CARE EDUCATION/TRAINING PROGRAM

## 2024-06-17 PROCEDURE — 85610 PROTHROMBIN TIME: CPT | Performed by: STUDENT IN AN ORGANIZED HEALTH CARE EDUCATION/TRAINING PROGRAM

## 2024-06-17 PROCEDURE — 62328 DX LMBR SPI PNXR W/FLUOR/CT: CPT

## 2024-06-17 PROCEDURE — 87205 SMEAR GRAM STAIN: CPT | Performed by: STUDENT IN AN ORGANIZED HEALTH CARE EDUCATION/TRAINING PROGRAM

## 2024-06-17 PROCEDURE — 88188 FLOWCYTOMETRY/READ 9-15: CPT | Mod: GC | Performed by: STUDENT IN AN ORGANIZED HEALTH CARE EDUCATION/TRAINING PROGRAM

## 2024-06-17 PROCEDURE — 250N000009 HC RX 250: Performed by: INTERNAL MEDICINE

## 2024-06-17 PROCEDURE — 85730 THROMBOPLASTIN TIME PARTIAL: CPT | Performed by: STUDENT IN AN ORGANIZED HEALTH CARE EDUCATION/TRAINING PROGRAM

## 2024-06-17 PROCEDURE — 83916 OLIGOCLONAL BANDS: CPT | Performed by: STUDENT IN AN ORGANIZED HEALTH CARE EDUCATION/TRAINING PROGRAM

## 2024-06-17 PROCEDURE — 36415 COLL VENOUS BLD VENIPUNCTURE: CPT | Performed by: STUDENT IN AN ORGANIZED HEALTH CARE EDUCATION/TRAINING PROGRAM

## 2024-06-17 PROCEDURE — 250N000011 HC RX IP 250 OP 636: Mod: JZ | Performed by: STUDENT IN AN ORGANIZED HEALTH CARE EDUCATION/TRAINING PROGRAM

## 2024-06-17 PROCEDURE — 999N000142 HC STATISTIC PROCEDURE PREP ONLY

## 2024-06-17 PROCEDURE — 88108 CYTOPATH CONCENTRATE TECH: CPT | Mod: TC | Performed by: STUDENT IN AN ORGANIZED HEALTH CARE EDUCATION/TRAINING PROGRAM

## 2024-06-17 PROCEDURE — 99152 MOD SED SAME PHYS/QHP 5/>YRS: CPT

## 2024-06-17 PROCEDURE — 88184 FLOWCYTOMETRY/ TC 1 MARKER: CPT | Performed by: STUDENT IN AN ORGANIZED HEALTH CARE EDUCATION/TRAINING PROGRAM

## 2024-06-17 PROCEDURE — 999N000132 HC STATISTIC PP CARE STAGE 1

## 2024-06-17 PROCEDURE — 62328 DX LMBR SPI PNXR W/FLUOR/CT: CPT | Performed by: NEUROLOGICAL SURGERY

## 2024-06-17 PROCEDURE — 89051 BODY FLUID CELL COUNT: CPT | Performed by: STUDENT IN AN ORGANIZED HEALTH CARE EDUCATION/TRAINING PROGRAM

## 2024-06-17 PROCEDURE — 86617 LYME DISEASE ANTIBODY: CPT | Performed by: STUDENT IN AN ORGANIZED HEALTH CARE EDUCATION/TRAINING PROGRAM

## 2024-06-17 PROCEDURE — 85610 PROTHROMBIN TIME: CPT

## 2024-06-17 PROCEDURE — 84157 ASSAY OF PROTEIN OTHER: CPT | Performed by: STUDENT IN AN ORGANIZED HEALTH CARE EDUCATION/TRAINING PROGRAM

## 2024-06-17 PROCEDURE — 88108 CYTOPATH CONCENTRATE TECH: CPT | Mod: 26 | Performed by: PATHOLOGY

## 2024-06-17 PROCEDURE — 82945 GLUCOSE OTHER FLUID: CPT | Performed by: STUDENT IN AN ORGANIZED HEALTH CARE EDUCATION/TRAINING PROGRAM

## 2024-06-17 PROCEDURE — 87483 CNS DNA AMP PROBE TYPE 12-25: CPT | Performed by: STUDENT IN AN ORGANIZED HEALTH CARE EDUCATION/TRAINING PROGRAM

## 2024-06-17 RX ORDER — FLUMAZENIL 0.1 MG/ML
0.2 INJECTION, SOLUTION INTRAVENOUS
Status: DISCONTINUED | OUTPATIENT
Start: 2024-06-17 | End: 2024-06-17 | Stop reason: HOSPADM

## 2024-06-17 RX ORDER — NALOXONE HYDROCHLORIDE 0.4 MG/ML
0.4 INJECTION, SOLUTION INTRAMUSCULAR; INTRAVENOUS; SUBCUTANEOUS
Status: DISCONTINUED | OUTPATIENT
Start: 2024-06-17 | End: 2024-06-17 | Stop reason: HOSPADM

## 2024-06-17 RX ORDER — LIDOCAINE 40 MG/G
CREAM TOPICAL
Status: CANCELLED | OUTPATIENT
Start: 2024-06-17

## 2024-06-17 RX ORDER — NALOXONE HYDROCHLORIDE 0.4 MG/ML
0.2 INJECTION, SOLUTION INTRAMUSCULAR; INTRAVENOUS; SUBCUTANEOUS
Status: DISCONTINUED | OUTPATIENT
Start: 2024-06-17 | End: 2024-06-17 | Stop reason: HOSPADM

## 2024-06-17 RX ORDER — FENTANYL CITRATE 50 UG/ML
25-50 INJECTION, SOLUTION INTRAMUSCULAR; INTRAVENOUS EVERY 5 MIN PRN
Status: DISCONTINUED | OUTPATIENT
Start: 2024-06-17 | End: 2024-06-17 | Stop reason: HOSPADM

## 2024-06-17 RX ORDER — LIDOCAINE HYDROCHLORIDE 10 MG/ML
1-30 INJECTION, SOLUTION EPIDURAL; INFILTRATION; INTRACAUDAL; PERINEURAL
Status: COMPLETED | OUTPATIENT
Start: 2024-06-17 | End: 2024-06-17

## 2024-06-17 RX ADMIN — MIDAZOLAM 0.5 MG: 1 INJECTION INTRAMUSCULAR; INTRAVENOUS at 11:35

## 2024-06-17 RX ADMIN — FENTANYL CITRATE 50 MCG: 50 INJECTION, SOLUTION INTRAMUSCULAR; INTRAVENOUS at 11:27

## 2024-06-17 RX ADMIN — MIDAZOLAM 0.5 MG: 1 INJECTION INTRAMUSCULAR; INTRAVENOUS at 12:24

## 2024-06-17 RX ADMIN — MIDAZOLAM 0.5 MG: 1 INJECTION INTRAMUSCULAR; INTRAVENOUS at 11:47

## 2024-06-17 RX ADMIN — LIDOCAINE HYDROCHLORIDE 15 ML: 10 INJECTION, SOLUTION EPIDURAL; INFILTRATION; INTRACAUDAL; PERINEURAL at 12:21

## 2024-06-17 RX ADMIN — MIDAZOLAM 1 MG: 1 INJECTION INTRAMUSCULAR; INTRAVENOUS at 11:27

## 2024-06-17 RX ADMIN — MIDAZOLAM 0.5 MG: 1 INJECTION INTRAMUSCULAR; INTRAVENOUS at 12:06

## 2024-06-17 RX ADMIN — FENTANYL CITRATE 25 MCG: 50 INJECTION, SOLUTION INTRAMUSCULAR; INTRAVENOUS at 11:35

## 2024-06-17 RX ADMIN — FENTANYL CITRATE 25 MCG: 50 INJECTION, SOLUTION INTRAMUSCULAR; INTRAVENOUS at 12:06

## 2024-06-17 RX ADMIN — FENTANYL CITRATE 25 MCG: 50 INJECTION, SOLUTION INTRAMUSCULAR; INTRAVENOUS at 12:24

## 2024-06-17 RX ADMIN — FENTANYL CITRATE 25 MCG: 50 INJECTION, SOLUTION INTRAMUSCULAR; INTRAVENOUS at 11:48

## 2024-06-17 ASSESSMENT — ACTIVITIES OF DAILY LIVING (ADL)
ADLS_ACUITY_SCORE: 36

## 2024-06-17 NOTE — PROGRESS NOTES
Pt arrived post LP. Band-aid to mid lower back, site C/D/I. One hour flat bedrest. Tolerating PO intake. Wife at the bedside.

## 2024-06-17 NOTE — PROGRESS NOTES
[facility]     Endovascular Surgical Neuroradiology Post-Procedure Note    Pre-Procedure Diagnosis:  NPH, encephalopathy  Post-Procedure Diagnosis:  same    Procedure(s):   Lumbar puncture with collection of CNS samples    Findings:  L1/L2 lumbar puncture performed with collection of CSF samples    Plan:  -Pending CSF samples CSF encephalopathy panel    Primary Surgeon:  Dr. Ember Rosales  Secondary Surgeon:  Not applicable  Secondary Surgeon Review:  None  Fellow:  Shorty  Additional Assistants:  none    Prior to the start of the procedure and with procedural staff participation, I verbally confirmed: the patient s identity using two indicators, relevant allergies, that the procedure was appropriate and matched the consent or emergent situation, and that the correct equipment/implants were available. Immediately prior to starting the procedure I conducted the Time Out with the procedural staff and re-confirmed the patient s name, procedure, and site/side. (The Joint Commission universal protocol was followed.)  Yes    PRU value: Not applicable    Anesthesia:  Conscious Sedation  Medications:  Midazolam 3 mg, Fentanyl 150 mcg   Puncture site:  L1/L2 inter spinous space    Fluoroscopy time (minutes):  20.4  Radiation dose (mGy):  610.4  Contrast amount (mL):  none    Estimated blood loss (mL):  1-2 cc    Closure:  Manual    Disposition:  Home after recovery.        Sedation Post-Procedure Summary    Sedatives: Midazolam 3 mg, Fentanyl 150 mcg     Vital signs and pulse oximetry were monitored and remained stable throughout the procedure, and sedation was maintained until the procedure was complete.  The patient was monitored by staff until sedation discharge criteria were met.    Patient tolerance:  Patient tolerated the procedure well with no immediate complications.    Time of sedation in minutes:  82 minutes from beginning to end of physician one to one  monitoring.    Verónica Oro MD  Pager:  [phone]

## 2024-06-17 NOTE — DISCHARGE INSTRUCTIONS
[facility]                                      Radiology Discharge instructions Post Lumbar Puncture         AFTER YOU GO HOME    Relax and take it easy for 24 hours  We suggest bedrest until the next morning, except to go to the bathroom.  You may resume normal activity tomorrow  You may remove the bandage on your back in the evening or next morning  You may resume bathing the next day  Drink at least 4 glasses of extra fluid today if not on a fluid restriction  DO NOT drive or operate machinery at home or at work for at least 24 hours                   CALL YOUR PRIMARY PHYSICIAN IF:  If you start to leak a large amount of fluid from the puncture site, lie down flat on your back. Call your primary physician, they will tell you if you need or return to the hospital  You develop a severe headache  You develop nausea or vomiting   You develop a temperature of 101 degrees or greater              [facility] RADIOLOGY DEPARTMENT             Procedure Physician: Dr. Rosales                                  Date of Procedure:June 17, 2024               [facility]...........[phone].......Ask for the Radiologist on call. Someone is on call 24 hour/day               [facility] Toll Free Number.........[phone].....Monday-Friday 8:00 am to 4:30 pm    .

## 2024-06-17 NOTE — IR NOTE
Patient Name: Perry Soni  Medical Record Number: [id number]  Today's Date: 6/17/2024    Procedure: Lumbar Puncture  Proceduralist: Dr. Rosales and Dr. Oro   Pathology present: NA    Procedure Start: 1126  Procedure end: 1248  Sedation medications administered: Midazolam 3 mg, Fentanyl 150 mcg       Report given to:  RN  : ESME    Other Notes: Pt arrived to IR room 3 from . Consent reviewed. Pt denies any questions or concerns regarding procedure. Pt positioned lateral (right side up) and monitored per protocol. Pt tolerated procedure without any noted complications. Pt transferred back to .

## 2024-06-17 NOTE — PROGRESS NOTES
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 He has noted decline in mental status, and gait instability. He is being evaluated for NPH and had lumbar puncture to rule out infection prior to placement of a drain. He had leukocytosis in his CSF and presents for repeat lumbar puncture with flow cytometry.     Medical History:  Past Medical History:   Diagnosis Date    AVM (arteriovenous malformation)     Depression     Hyperlipidemia        Surgical History:  Past Surgical History:   Procedure Laterality Date    abd hernia repair      CRANIOTOMY, REPAIR ARTERIOVENOUS MALFORMATION, COMBINED Left 2015    Procedure: COMBINED CRANIOTOMY, REPAIR ARTERIOVENOUS MALFORMATION;  Surgeon: Ember Rosales MD;  Location: [facility] OR    HERNIA REPAIR      IR LUMBAR PUNCTURE  2023    IR LUMBAR PUNCTURE  2024       Family History:  History reviewed. No pertinent family history.    Social History:  Social History     Socioeconomic History    Marital status:      Spouse name: Jono    Number of children: 2    Years of education: Not on file    Highest education level: Not on file   Occupational History    Occupation: self employed,    Tobacco Use    Smoking status: Former     Current packs/day: 0.00     Average packs/day: 1 pack/day for 15.0 years (15.0 ttl pk-yrs)     Types: Cigarettes     Start date: 1966     Quit date: 1981     Years since quittin.7    Smokeless tobacco: Never   Substance and Sexual Activity    Alcohol use: No    Drug use: No    Sexual activity: Not on file   Other Topics Concern    Not on file   Social History Narrative    Not on file     Social  Determinants of Health     Financial Resource Strain: Not on file   Food Insecurity: Not on file   Transportation Needs: Not on file   Physical Activity: Not on file   Stress: Not on file   Social Connections: Not on file   Interpersonal Safety: Unknown (1/5/2024)    [facility]    Humiliation, Afraid, Rape, and Kick questionnaire     Fear of Current or Ex-Partner: Not on file     Emotionally Abused: Not on file     Physically Abused: No     Sexually Abused: No   Housing Stability: Not on file       Allergies:  Allergies   Allergen Reactions    Wellbutrin [Bupropion Hcl] Anxiety       Is there a contrast allergy?  No    Medications:  Medications Prior to Admission   Medication Sig Dispense Refill Last Dose    aspirin (ASA) 81 MG chewable tablet Take 81 mg by mouth   6/16/2024 at 2000    atorvastatin (LIPITOR) 40 MG tablet Take 40 mg by mouth every evening   6/16/2024 at 2000    carbidopa-levodopa (SINEMET)  MG tablet Take 1 tablet by mouth 3 times daily 270 tablet 1 6/17/2024 at 0715    diphenhydrAMINE-acetaminophen (TYLENOL PM EXTRA STRENGTH)  MG tablet Take 1 tablet by mouth nightly as needed   6/16/2024    FLUoxetine HCl (PROZAC PO) Take 10 mg by mouth daily    6/17/2024 at 0715    tamsulosin (FLOMAX) 0.4 MG capsule Take 2 capsules by mouth daily   6/16/2024 at 2000    acetaminophen 650 MG TABS Take 650 mg by mouth every 6 hours as needed for mild pain 100 tablet      acyclovir (ZOVIRAX) 5 % ointment Apply 0.5 inches topically 5 times daily. PRN       amLODIPine (NORVASC) 5 MG tablet Take 5 mg by mouth daily       fluticasone (FLONASE) 50 MCG/ACT nasal spray 2 sprays      .    ROS:      PHYSICAL EXAMINATION  Vital Signs:  B/P: Data Unavailable,  T: Data Unavailable,  P: Data Unavailable,  R: Data Unavailable    Cardio:  RRR  Pulmonary:  no respiratory distress  Abdomen:  non-distended    Neurologic  Mental Status:  fully alert, attentive and oriented, follows commands,  speech clear and fluent  Cranial Nerves:  visual fields intact, facial sensation intact and symmetric, facial movements symmetric, hearing not formally tested but intact to conversation, palate elevation symmetric and uvula midline, no dysarthria, shoulder shrug strong bilaterally, tongue protrusion midline  Motor:  no abnormal movements, normal tone throughout, normal muscle bulk, no pronator drift, strength 5/5 throughout upper and lower extremities, bilateral left greater than right intention tremor  Sensory:  intact/symmetric to light touch and pin prick throughout upper and lower extremities  Coordination:  FNF and HS intact without dysmetria    Pre-procedure National Institutes of Health Stroke Scale:   Not applicable    LABS  (most recent Cr, BUN, GFR, PLT, INR, PTT within the past 7 days):  No lab results found in last 7 days.     Platelet Function P2Y12 (PRU):  Not applicable      ASSESSMENT:  83M with elevated white count in cerebral spinal fluid, he has had 2 lumbar punctures with persistent elevated whites. He has remained asymptomatic and denies any weakness, numbness, headache, neck stiffness, fevers or chills. He presents for 3rd lumbar puncture.     PLAN:   Lumbar puncture with CSF studies      PRE-PROCEDURE SEDATION ASSESSMENT     Pre-Procedure Sedation Assessment done at 1000.    Expected Level:  Minimal Sedation    Indication:  Sedation is required to allow for neurointerventional procedure.    Consent obtained from patient after discussing the risks, benefits and alternatives.     PO Intake:  Appropriately NPO for procedure    ASA Class:  Class 2 - MILD SYSTEMIC DISEASE, NO ACUTE PROBLEMS, NO FUNCTIONAL LIMITATIONS.    Mallampati:  Grade 2:  Soft palate, base of uvula, tonsillar pillars, and portion of posterior pharyngeal wall visible    History and physical reviewed and no updates needed. I have reviewed the lab findings, diagnostic data, medications, and the plan for sedation. I have  determined this patient to be an appropriate candidate for the planned sedation and procedure and have reassessed the patient IMMEDIATELY PRIOR to sedation and procedure.    Patient was discussed with the Attending, Dr. Rosales  , who agrees with the plan.  Ml Garcia MD  Endovascular Surgical Neuroradiology Fellow  [facility]  [phone]    Endovascular Surgical Neuroradiology staff is Dr. Rosales

## 2024-06-17 NOTE — PROGRESS NOTES
Istat INR 1.0. IR charge notified. Wife Jono in gold waiting room. No active pt complaints. Resting comfortably on stretcher 2A #6.

## 2024-06-18 ENCOUNTER — TELEPHONE (OUTPATIENT)
Dept: NEUROSURGERY | Facility: CLINIC | Age: 84
End: 2024-06-18
Payer: COMMERCIAL

## 2024-06-18 ENCOUNTER — PATIENT OUTREACH (OUTPATIENT)
Dept: NEUROLOGY | Facility: CLINIC | Age: 84
End: 2024-06-18
Payer: COMMERCIAL

## 2024-06-18 DIAGNOSIS — R26.89 IMBALANCE: Primary | ICD-10-CM

## 2024-06-18 LAB

## 2024-06-18 NOTE — PROGRESS NOTES
Endovascular Surgical Neuroradiology - Post Discharge Call    Admit: 6/17/24    Discharge: 6/17/24    Facility: [facility]    MD/Service: Dr. Rosales/VIJAY    Pre-Procedure Diagnosis:  NPH, encephalopathy    Post-Procedure Diagnosis:  same     Procedure(s):   Lumbar puncture with collection of CNS samples     Findings:  L1/L2 lumbar puncture performed with collection of CSF samples     Plan:  -Pending CSF samples CSF encephalopathy panel    LVMM for patient letting him know I was calling to check in after procedure yesterday. Left my contact information and encouraged patient to call back with any questions or concerns.   Jackie Mahoney RN 6/18/2024 9:39 AM

## 2024-06-18 NOTE — TELEPHONE ENCOUNTER
[facility] Call Center    Phone Message    May a detailed message be left on voicemail: yes     Reason for Call: Pt called.  He wants to know if Dr. Rosales thinks Occupational Therapy would be beneficial?  Please call Pt back to let him know.  Thanks.

## 2024-06-18 NOTE — TELEPHONE ENCOUNTER
Called patient back to discuss. LV for patient letting him know I was calling to discuss occupational therapy vs physical therapy. Let him know if he is more concerned about his hands and activities of daily living then we should go with occupational therapy. If he is more concerned regarding walking we should do physical therapy. Let patient know to please call us back to let us know which he would prefer and I would be happy to enter the referral. Left my contact information.   Jackie Mahoney RN 6/18/2024 3:30 PM

## 2024-06-19 LAB
ALB CSF/SERPL: 12.7 RATIO
ALBUMIN CSF-MCNC: 45 MG/DL
ALBUMIN SERPL-MCNC: 3546 MG/DL
B BURGDOR IGG CSF QL IB: NEGATIVE
B BURGDOR IGM CSF QL IB: NEGATIVE
IGG CSF-MCNC: 13.5 MG/DL
IGG SERPL-MCNC: 582 MG/DL
IGG SYNTH RATE SER+CSF CALC-MRATE: 49.2 MG/D
IGG/ALB CLEAR SER+CSF-RTO: 1.83 RATIO
IGG/ALB CSF: 0.3 RATIO
OLIGOCLONAL BANDS CSF ELPH-IMP: ABNORMAL
OLIGOCLONAL BANDS CSF ELPH-IMP: POSITIVE
OLIGOCLONAL BANDS CSF IEF: 12 BANDS

## 2024-06-19 NOTE — TELEPHONE ENCOUNTER
"[facility] Call Center    Phone Message    May a detailed message be left on voicemail: yes     Reason for Call: Other: Patient called stating he would like to do \"walking therapy\", he states he is wanting Physical Therapy and wonders if Dr. Rosales can place a referral for him.      Action Taken: Message routed to:  [facility] Neurosurgery    Travel Screening: Not Applicable     Date of Service:                                                                     "

## 2024-06-19 NOTE — TELEPHONE ENCOUNTER
Spoke with patient. Informed PT referral placed. Provided PT phone number and instructed to call if he has not heard from them by Monday. Patient verbalized understanding and appreciative of the referral.     Sunni Adams RN 6/19/2024 2:14 PM

## 2024-06-22 LAB
BACTERIA CSF CULT: NO GROWTH
GRAM STAIN RESULT: NORMAL
GRAM STAIN RESULT: NORMAL

## 2024-06-27 ENCOUNTER — THERAPY VISIT (OUTPATIENT)
Dept: PHYSICAL THERAPY | Facility: CLINIC | Age: 84
End: 2024-06-27
Attending: NEUROLOGICAL SURGERY
Payer: COMMERCIAL

## 2024-06-27 DIAGNOSIS — R26.89 IMPAIRED GAIT AND MOBILITY: Primary | ICD-10-CM

## 2024-06-27 DIAGNOSIS — R26.89 IMBALANCE: ICD-10-CM

## 2024-06-27 PROCEDURE — 97162 PT EVAL MOD COMPLEX 30 MIN: CPT | Mod: GP | Performed by: PHYSICAL THERAPIST

## 2024-06-27 PROCEDURE — 97110 THERAPEUTIC EXERCISES: CPT | Mod: GP | Performed by: PHYSICAL THERAPIST

## 2024-06-27 NOTE — PROGRESS NOTES
PHYSICAL THERAPY EVALUATION  Type of Visit: Evaluation        Fall Risk Screen:  Fall screen completed by: PT  Have you fallen 2 or more times in the past year?: Yes  Have you fallen and had an injury in the past year?: No  Is patient a fall risk?: Yes    Subjective       Presenting condition or subjective complaint: physucal therapy  Perry presents with instability and gait changes that began about 2 years ago. Found that he was shuffling his feet. Originally thought to be hydrocephalus and has had 3 lumbar punctures. Was considering a shunt but wanted a second opinion from a neurologist who suspects Parkinsonism. Started on sinemet. Reports 2 falls in the past year without injury. Has a cane and a walker but has not used the walker. Previously was going to the gym 3x/week but not yet this year.   Date of onset: 06/19/24 (date of order)    Relevant medical history:     Dates & types of surgery:      Prior diagnostic imaging/testing results: MRI     Prior therapy history for the same diagnosis, illness or injury:        Prior Level of Function  Transfers: Independent  Ambulation: Independent      Living Environment  Social support: With a significant other or spouse   Type of home: House; 2-story House  Stairs to enter the home:       Flight of stairs, has to be mindful  Ramp: No   Stairs inside the home: Yes       Help at home: None  Equipment owned: Straight Cane; Walker     Employment:    Retired- [employer]   Hobbies/Interests:  reading    Patient goals for therapy: walk easily with good balanceimprove walking    Pain assessment: chronic back pain, worse with walking. One of the limiting factors     Objective   Cognitive Status Examination  Orientation: Oriented to person, place and time   Level of Consciousness: Alert    OBSERVATION: presents with SPC  POSTURE: forward head, forward flexed at hips    STRENGTH: functional weakness noted with sit to stands    BED MOBILITY:  independent    TRANSFERS: requires UE support    GAIT:   Gait Description: ambulates with forward flexed posture, decrease arm swing, poor R foot clearance, often catching foot during swing phase    BALANCE:     SPECIAL TESTS  Functional Gait Assessment (FGA)    (<22/30 indicates fall risk)   10 Meter Walk Test (Comfortable)  0.42 m/s with no AD   6 Minute Walk Test (6MWT)   NA today but would benefit from future assessment        Lopez Balance Scale (BBS)    (<45/56 indicates fall risk)   5 Times Sit-to-Stand (5TSTS) 0- LOB posterior and R      Dynamic Gait Index (DGI) Total Score (out of 24): 11   (<19/24 indicates fall risk)   Timed Up and Go (TUG) - sec    Single Leg Stance Right (sec)    Single Leg Stance Left (sec)    Modified CTSIB Conditions (sec) Cond 1: 30  Cond 2: 9  Cond 4: 30  Cond 5 : 1   Romberg  (sec)    Sharpened Romberg (sec)    4 square step test 34.7 sec with CGA, needing cues for sequencing       SENSATION:  reports mild vibratory loss during recent MD testing          Assessment & Plan   CLINICAL IMPRESSIONS  Medical Diagnosis: Imbalance    Treatment Diagnosis: Impaired gait and mobility   Impression/Assessment: Patient is a 83 year old male with gait complaints.  The following significant findings have been identified: Pain, Decreased strength, Impaired balance, Impaired sensation, Impaired gait, Impaired muscle performance, Decreased activity tolerance, Impaired posture, and Instability. These impairments interfere with their ability to perform self care tasks, recreational activities, household chores, driving , household mobility, community mobility, and increased fall risk  as compared to previous level of function.     Clinical Decision Making (Complexity):  Clinical Presentation: Evolving/Changing  Clinical Presentation Rationale: based on medical and personal factors listed in PT evaluation  Clinical Decision Making (Complexity): Moderate complexity    PLAN OF CARE  Treatment  Interventions:  Interventions: Gait Training, Manual Therapy, Neuromuscular Re-education, Therapeutic Activity, Therapeutic Exercise, Self-Care/Home Management    Long Term Goals     PT Goal 1  Goal Identifier: HEP  Goal Description: The pt will be independent with an HEP in order to improve self management of symptoms  Target Date: 09/24/24  PT Goal 2  Goal Identifier: DGI  Goal Description: The pt will improve score on DGI to >14/24 indicating a reduction in fall risk  Goal Progress: baseline: 11/24 without AD  Target Date: 09/24/24  PT Goal 3  Goal Identifier: Sit to stand  Goal Description: The pt will be able to safely complete sit to stand transfer from standard chair without UE support and no LOB, improving ability to transfer safely from a variety of surfaces  Target Date: 09/24/24  PT Goal 4  Goal Identifier: Gait speed  Goal Description: The pt will improve self selected gait speed to >0.65 m/s, improving ability to ambulate in the community  Goal Progress: baseline: 0.42 m/s  Target Date: 09/24/24  PT Goal 5  Goal Identifier: 6MWT  Goal Description: The pt will increase distance walked in 6 minutes by 150' due to improvements in gait speed, activity tolerance, and community mobility  Goal Progress: NA today  Target Date: 09/24/24      Frequency of Treatment: 1x/week (per patient request)  Duration of Treatment: 90 days    Education Assessment:   Learner/Method: Patient  Education Comments: PT role, POC    Risks and benefits of evaluation/treatment have been explained.   Patient/Family/caregiver agrees with Plan of Care.     Evaluation Time:     PT Eval, Moderate Complexity Minutes (58262): 35    Signing Clinician: Aurelia Hernandez, PT        [facility]                                                                                   OUTPATIENT PHYSICAL THERAPY      PLAN OF TREATMENT FOR OUTPATIENT REHABILITATION   Patient's Last Name, First Name, Perry Fish Date  of Birth:  1940   Provider's Name   [facility]   Medical Record No.  [id number]     Onset Date: 06/19/24 (date of order)  Start of Care Date: 06/27/24     Medical Diagnosis:  Imbalance      PT Treatment Diagnosis:  Impaired gait and mobility Plan of Treatment  Frequency/Duration: 1x/week (per patient request)/ 90 days    Certification date from 06/27/24 to 09/24/24         See note for plan of treatment details and functional goals     Aurelia Hernandez, PT                         I CERTIFY THE NEED FOR THESE SERVICES FURNISHED UNDER        THIS PLAN OF TREATMENT AND WHILE UNDER MY CARE     (Physician attestation of this document indicates review and certification of the therapy plan).              Referring Provider:  Ember Rosales    Initial Assessment  See Epic Evaluation- Start of Care Date: 06/27/24

## 2024-07-02 ENCOUNTER — THERAPY VISIT (OUTPATIENT)
Dept: PHYSICAL THERAPY | Facility: CLINIC | Age: 84
End: 2024-07-02
Payer: COMMERCIAL

## 2024-07-02 DIAGNOSIS — R26.89 IMBALANCE: Primary | ICD-10-CM

## 2024-07-02 DIAGNOSIS — R26.89 IMPAIRED GAIT AND MOBILITY: ICD-10-CM

## 2024-07-02 PROCEDURE — 97110 THERAPEUTIC EXERCISES: CPT | Mod: GP

## 2024-07-02 PROCEDURE — 97530 THERAPEUTIC ACTIVITIES: CPT | Mod: GP

## 2024-07-02 PROCEDURE — 97112 NEUROMUSCULAR REEDUCATION: CPT | Mod: GP

## 2024-07-16 ENCOUNTER — OFFICE VISIT (OUTPATIENT)
Dept: NEUROLOGY | Facility: CLINIC | Age: 84
End: 2024-07-16
Payer: COMMERCIAL

## 2024-07-16 VITALS
SYSTOLIC BLOOD PRESSURE: 132 MMHG | BODY MASS INDEX: 21.24 KG/M2 | WEIGHT: 148 LBS | DIASTOLIC BLOOD PRESSURE: 72 MMHG | HEART RATE: 79 BPM

## 2024-07-16 DIAGNOSIS — R27.9 UNSPECIFIED LACK OF COORDINATION: ICD-10-CM

## 2024-07-16 DIAGNOSIS — G03.9 MENINGITIS: ICD-10-CM

## 2024-07-16 DIAGNOSIS — G20.C PARKINSONISM, UNSPECIFIED PARKINSONISM TYPE (H): Primary | ICD-10-CM

## 2024-07-16 DIAGNOSIS — R41.3 MEMORY CHANGE: ICD-10-CM

## 2024-07-16 LAB
Lab: NORMAL
PERFORMING LABORATORY: NORMAL
SPECIMEN STATUS: NORMAL
TEST NAME: NORMAL

## 2024-07-16 PROCEDURE — 36415 COLL VENOUS BLD VENIPUNCTURE: CPT | Performed by: INTERNAL MEDICINE

## 2024-07-16 PROCEDURE — 99000 SPECIMEN HANDLING OFFICE-LAB: CPT | Performed by: INTERNAL MEDICINE

## 2024-07-16 PROCEDURE — 86255 FLUORESCENT ANTIBODY SCREEN: CPT | Mod: 90 | Performed by: INTERNAL MEDICINE

## 2024-07-16 PROCEDURE — 99215 OFFICE O/P EST HI 40 MIN: CPT | Performed by: INTERNAL MEDICINE

## 2024-07-16 PROCEDURE — 86341 ISLET CELL ANTIBODY: CPT | Performed by: INTERNAL MEDICINE

## 2024-07-16 NOTE — PROGRESS NOTES
[facility] Neurology Follow Up Visit    Perry Soni MRN# [id number]   Age: 83 year old YOB: 1940     Brief history of symptoms: The patient was initially seen in neurologic consultation on 5/28/2024 for evaluation of imbalance, tremors, memory problems. Please see the comprehensive neurologic consultation note from that date in the Epic records for details.            Assessment and Plan:   Assessment:  Perry Soni is a 83 year old male who presents today for follow-up of imbalance, tremors, memory problems.     Symptoms have developed over the last year. Due to ventriculomegaly on imaging he had CSF tap test and had some improvement. MRI brain does show ventriculomegaly, but does not have classic features of NPH (no acute callosal angle, DESH, tight convexity). He has parkinsonism on examination. He has had equivocal response to Sinemet. CSF studies showed pleocytosis and repeat CSF testing interesting showed 12 oligoclonal bands with negative infectious studies. Unfortunately CSF [place] encephalopathy panel was not sent, but we will send this from the serum today. Etiology such as sarcoidosis causing secondary NPH picture could be considered. We will repeat MRI brain given worsening of symptoms. We will also do CT chest/abdomen/pelvis to evaluate for granulomas/malignancy. Neuroimmunology input would also be helpful.      Plan:  - Serum [place] Autoimmune Encephalopathy Panel  - Neuro-immunology referral  - Repeat MRI brain with and without contrast  - CT chest/abdomen/pelvis  - Continue Sinemet 1 tablet (25/100) TID    Follow up in Neurology clinic pending above    Benoit Gill MD   of Neurology  [facility]  -------------------------------------------------------------------------------------------------------------------------------------------    Interval history:   His walking is worse.     His tremors are about the same. He thought the Sinemet helped initially, but now  isn't sure.     He still has urinary urgency.       Past Medical History:     Patient Active Problem List   Diagnosis    Cerebrovascular dural AV fistula    Dural arteriovenous fistula     Past Medical History:   Diagnosis Date    AVM (arteriovenous malformation)     Depression     Hyperlipidemia         Past Surgical History:     Past Surgical History:   Procedure Laterality Date    abd hernia repair      CRANIOTOMY, REPAIR ARTERIOVENOUS MALFORMATION, COMBINED Left 2015    Procedure: COMBINED CRANIOTOMY, REPAIR ARTERIOVENOUS MALFORMATION;  Surgeon: Ember Rosales MD;  Location: [facility] OR    HERNIA REPAIR      IR LUMBAR PUNCTURE  2023    IR LUMBAR PUNCTURE  2024        Social History:     Social History     Tobacco Use    Smoking status: Former     Current packs/day: 0.00     Average packs/day: 1 pack/day for 15.0 years (15.0 ttl pk-yrs)     Types: Cigarettes     Start date: 1966     Quit date: 1981     Years since quittin.8    Smokeless tobacco: Never   Substance Use Topics    Alcohol use: No    Drug use: No        Family History:   No family history on file.     Medications:     Current Outpatient Medications   Medication Sig Dispense Refill    acetaminophen 650 MG TABS Take 650 mg by mouth every 6 hours as needed for mild pain 100 tablet     acyclovir (ZOVIRAX) 5 % ointment Apply 0.5 inches topically 5 times daily. PRN      amLODIPine (NORVASC) 5 MG tablet Take 5 mg by mouth daily      aspirin (ASA) 81 MG chewable tablet Take 81 mg by mouth      atorvastatin (LIPITOR) 40 MG tablet Take 40 mg by mouth every evening      carbidopa-levodopa (SINEMET)  MG tablet Take 1 tablet by mouth 3 times daily 270 tablet 1    diphenhydrAMINE-acetaminophen (TYLENOL PM EXTRA STRENGTH)  MG tablet Take 1 tablet by mouth nightly as needed      FLUoxetine HCl (PROZAC PO) Take 10 mg by mouth daily       fluticasone (FLONASE) 50 MCG/ACT nasal spray 2 sprays      tamsulosin (FLOMAX) 0.4  MG capsule Take 2 capsules by mouth daily       No current facility-administered medications for this visit.        Allergies:     Allergies   Allergen Reactions    Wellbutrin [Bupropion Hcl] Anxiety        Review of Systems:   As above     Physical Exam:   Vitals: /72 (BP Location: Right arm, Patient Position: Sitting, Cuff Size: Adult Regular)   Pulse 79   Wt 67.1 kg (148 lb)   BMI 21.24 kg/m     General: Seated comfortably in no acute distress.  Lungs: breathing comfortably  Neurologic:     Mental Status: Fully alert, attentive. Language normal, speech clear and fluent, no paraphasic errors.      Cranial Nerves: Visual fields intact. PERRL. EOMI with normal smooth pursuit. Facial sensation intact/symmetric. Facial movements symmetric. Hearing not formally tested but intact to conversation. Palate elevation symmetric, uvula midline. No dysarthria. Shoulder shrug strong bilaterally. Tongue protrusion midline.     Motor: Mild intermittent resting tremor in upper extremities left > right. Mild action tremor in bilateral upper extremities. Muscle tone with bilateral cogwheel rigidity in upper extremities. Mild bradykinesia in left finger taps. Strength 5/5 throughout upper and lower extremities.       Sensory: Intact/symmetric to light touch throughout upper and lower extremities.      Coordination: Finger-nose-finger and heel-shin intact without dysmetria.      Gait: [place] shuffling steps and decreased arm swing.      Data reviewed on previous visits    Imaging:  MRA head 1/2024  IMPRESSION:   1. No evidence for large vessel occlusion or aneurysm.   2. New moderate right-sided and mild left-sided narrowing of the P3 segments of the posterior cerebral arteries bilaterally which may be accentuated by hypoplasia.      MRA neck 1/2024  IMPRESSION:   1. Mild short segment stenosis of the origin of the right vertebral artery unchanged. Carotid arterial systems otherwise patent and nonstenotic bilaterally.   2.  Moderate to severe short segment stenosis of the right vertebral artery origin unchanged. Left vertebral system patent and nonstenotic.   3. No evidence for dissection.      MRI brain 1/2024  IMPRESSION:    1. 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   2. Stable ventriculomegaly.   3. Stable moderate periventricular and subcortical white matter T2/FLAIR signal hyperintensities, commonly seen with chronic microvascular ischemia.          TTE 3/2024  CONCLUSIONS   Global and regional left ventricular function is normal.   Left ventricular ejection fraction is visually estimated at 60%.   Mild dilation of the aorta is present involving the sinuses of   Valsalva. (Maximal dimension 4.5 cm).      MRI c/t 12/2023  IMPRESSION:    1. Subacute compression fracture along the superior endplate of T12 with less than 20% loss of vertebral body height.   2. Severe right neural foraminal stenosis at C3-4. Correlate with right C4 radicular symptoms.   3. Severe left neural foraminal stenosis with effacement left lateral recess at C5-6. Correlate with left C6 radicular symptoms.   4. Additional degenerative changes as above.      Procedures:  14 day ziopatch   Summary:   - Zio is unremarkable   - No atrial fibrillation detected.     Laboratory:  CSF: 61 WBC, protein 61, normal glucose, cytology negative (5/2024)  CSF: 63 WBC (12/2023)    Pertinent Investigations since last visit:   CSF cytology/cytometry negative, WBC 65, protein 77, glucose 38, OCBs 12, meningitis/encephalitis panel negative, lyme negative        The total time of this encounter today amounted to 46 minutes. This time included time spent with the patient, prep work, ordering tests, and performing post visit documentation.

## 2024-07-16 NOTE — LETTER
7/16/2024      Perry Soni  [address] [place] [address]      Dear Colleague,    Thank you for referring your patient, Perry Soni, to the [facility] [place]. Please see a copy of my visit note below.    [facility] Neurology Follow Up Visit    Perry Soni MRN# [id number]   Age: 83 year old YOB: 1940     Brief history of symptoms: The patient was initially seen in neurologic consultation on 5/28/2024 for evaluation of imbalance, tremors, memory problems. Please see the comprehensive neurologic consultation note from that date in the Epic records for details.            Assessment and Plan:   Assessment:  Perry Soni is a 83 year old male who presents today for follow-up of imbalance, tremors, memory problems.     Symptoms have developed over the last year. Due to ventriculomegaly on imaging he had CSF tap test and had some improvement. MRI brain does show ventriculomegaly, but does not have classic features of NPH (no acute callosal angle, DESH, tight convexity). He has parkinsonism on examination. He has had equivocal response to Sinemet. CSF studies showed pleocytosis and repeat CSF testing interesting showed 12 oligoclonal bands with negative infectious studies. Unfortunately CSF [place] encephalopathy panel was not sent, but we will send this from the serum today. Etiology such as sarcoidosis causing secondary NPH picture could be considered. We will repeat MRI brain given worsening of symptoms. We will also do CT chest/abdomen/pelvis to evaluate for granulomas/malignancy. Neuroimmunology input would also be helpful.      Plan:  - Serum [place] Autoimmune Encephalopathy Panel  - Neuro-immunology referral  - Repeat MRI brain with and without contrast  - CT chest/abdomen/pelvis  - Continue Sinemet 1 tablet (25/100) TID    Follow up in Neurology clinic pending above    Benoit Gill MD   of Neurology  [facility]  [place]  -------------------------------------------------------------------------------------------------------------------------------------------    Interval history:   His walking is worse.     His tremors are about the same. He thought the Sinemet helped initially, but now isn't sure.     He still has urinary urgency.       Past Medical History:     Patient Active Problem List   Diagnosis     Cerebrovascular dural AV fistula     Dural arteriovenous fistula     Past Medical History:   Diagnosis Date     AVM (arteriovenous malformation)      Depression      Hyperlipidemia         Past Surgical History:     Past Surgical History:   Procedure Laterality Date     abd hernia repair       CRANIOTOMY, REPAIR ARTERIOVENOUS MALFORMATION, COMBINED Left 2015    Procedure: COMBINED CRANIOTOMY, REPAIR ARTERIOVENOUS MALFORMATION;  Surgeon: Ember Rosales MD;  Location: [facility] OR     HERNIA REPAIR       IR LUMBAR PUNCTURE  2023     IR LUMBAR PUNCTURE  2024        Social History:     Social History     Tobacco Use     Smoking status: Former     Current packs/day: 0.00     Average packs/day: 1 pack/day for 15.0 years (15.0 ttl pk-yrs)     Types: Cigarettes     Start date: 1966     Quit date: 1981     Years since quittin.8     Smokeless tobacco: Never   Substance Use Topics     Alcohol use: No     Drug use: No        Family History:   No family history on file.     Medications:     Current Outpatient Medications   Medication Sig Dispense Refill     acetaminophen 650 MG TABS Take 650 mg by mouth every 6 hours as needed for mild pain 100 tablet      acyclovir (ZOVIRAX) 5 % ointment Apply 0.5 inches topically 5 times daily. PRN       amLODIPine (NORVASC) 5 MG tablet Take 5 mg by mouth daily       aspirin (ASA) 81 MG chewable tablet Take 81 mg by mouth       atorvastatin (LIPITOR) 40 MG tablet Take 40 mg by mouth every evening       carbidopa-levodopa (SINEMET)  MG tablet Take 1 tablet  by mouth 3 times daily 270 tablet 1     diphenhydrAMINE-acetaminophen (TYLENOL PM EXTRA STRENGTH)  MG tablet Take 1 tablet by mouth nightly as needed       FLUoxetine HCl (PROZAC PO) Take 10 mg by mouth daily        fluticasone (FLONASE) 50 MCG/ACT nasal spray 2 sprays       tamsulosin (FLOMAX) 0.4 MG capsule Take 2 capsules by mouth daily       No current facility-administered medications for this visit.        Allergies:     Allergies   Allergen Reactions     Wellbutrin [Bupropion Hcl] Anxiety        Review of Systems:   As above     Physical Exam:   Vitals: /72 (BP Location: Right arm, Patient Position: Sitting, Cuff Size: Adult Regular)   Pulse 79   Wt 67.1 kg (148 lb)   BMI 21.24 kg/m     General: Seated comfortably in no acute distress.  Lungs: breathing comfortably  Neurologic:     Mental Status: Fully alert, attentive. Language normal, speech clear and fluent, no paraphasic errors.      Cranial Nerves: Visual fields intact. PERRL. EOMI with normal smooth pursuit. Facial sensation intact/symmetric. Facial movements symmetric. Hearing not formally tested but intact to conversation. Palate elevation symmetric, uvula midline. No dysarthria. Shoulder shrug strong bilaterally. Tongue protrusion midline.     Motor: Mild intermittent resting tremor in upper extremities left > right. Mild action tremor in bilateral upper extremities. Muscle tone with bilateral cogwheel rigidity in upper extremities. Mild bradykinesia in left finger taps. Strength 5/5 throughout upper and lower extremities.       Sensory: Intact/symmetric to light touch throughout upper and lower extremities.      Coordination: Finger-nose-finger and heel-shin intact without dysmetria.      Gait: [place] shuffling steps and decreased arm swing.      Data reviewed on previous visits    Imaging:  MRA head 1/2024  IMPRESSION:   1. No evidence for large vessel occlusion or aneurysm.   2. New moderate right-sided and mild left-sided  narrowing of the P3 segments of the posterior cerebral arteries bilaterally which may be accentuated by hypoplasia.      MRA neck 1/2024  IMPRESSION:   1. Mild short segment stenosis of the origin of the right vertebral artery unchanged. Carotid arterial systems otherwise patent and nonstenotic bilaterally.   2. Moderate to severe short segment stenosis of the right vertebral artery origin unchanged. Left vertebral system patent and nonstenotic.   3. No evidence for dissection.      MRI brain 1/2024  IMPRESSION:    1. 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   2. Stable ventriculomegaly.   3. Stable moderate periventricular and subcortical white matter T2/FLAIR signal hyperintensities, commonly seen with chronic microvascular ischemia.          TTE 3/2024  CONCLUSIONS   Global and regional left ventricular function is normal.   Left ventricular ejection fraction is visually estimated at 60%.   Mild dilation of the aorta is present involving the sinuses of   Valsalva. (Maximal dimension 4.5 cm).      MRI c/t 12/2023  IMPRESSION:    1. Subacute compression fracture along the superior endplate of T12 with less than 20% loss of vertebral body height.   2. Severe right neural foraminal stenosis at C3-4. Correlate with right C4 radicular symptoms.   3. Severe left neural foraminal stenosis with effacement left lateral recess at C5-6. Correlate with left C6 radicular symptoms.   4. Additional degenerative changes as above.      Procedures:  14 day ziopatch   Summary:   - Zio is unremarkable   - No atrial fibrillation detected.     Laboratory:  CSF: 61 WBC, protein 61, normal glucose, cytology negative (5/2024)  CSF: 63 WBC (12/2023)    Pertinent Investigations since last visit:   CSF cytology/cytometry negative, WBC 65, protein 77, glucose 38, OCBs 12, meningitis/encephalitis panel negative, lyme negative        The total time of this encounter today  amounted to 46 minutes. This time included time spent with the patient, prep work, ordering tests, and performing post visit documentation.      Again, thank you for allowing me to participate in the care of your patient.        Sincerely,        Benoit Gill MD

## 2024-07-17 ENCOUNTER — TELEPHONE (OUTPATIENT)
Dept: NEUROSURGERY | Facility: CLINIC | Age: 84
End: 2024-07-17
Payer: COMMERCIAL

## 2024-07-17 RX ORDER — LORAZEPAM 0.5 MG/1
TABLET ORAL
Qty: 3 TABLET | Refills: 0 | Status: SHIPPED | OUTPATIENT
Start: 2024-07-17

## 2024-07-17 NOTE — TELEPHONE ENCOUNTER
"Writer faxed imaging orders for a CT and MRI to [facility] to call the patient and schedule close to [place].  Writer called patient to confirm orders had been sent to [facility] for scheduling.Writer requested patient call back to schedule MS visit at the [facility] , Per J[place], after the imaging has been scheduled.   Face sheet OV note and Orders faxed to [phone].  Verified delivered by right fax.    Patient also asked about carbidopa levidopa  and wanted to know if it was ok to stop it as he felt it wasn't doing much  and put it as \" sort of kicking the can down the road.\" As the patient feels there's not been much noticeable improvement and is interested in stopping the medication. Writer consulted Dr Gill and told the patient that he wants th patient to titrate off 1/2 tablet three times a day for one week then discontinue.    Patient understood and writer  ended the call.      MELODY Rodriguez., ANTIONETTE ([facility])    "

## 2024-07-17 NOTE — TELEPHONE ENCOUNTER
Patient called writer to discuss imaging schedule at [facility].   Ist imaging exam on July 24 th @ 1:50 PM  2nd @ 7/29/24 @ 9:15 AM    Patient is requesting Ativan for both imaging exams to help keep the patient still during there exam. Ativan x 2.    Writer called patient to schedule with Dimitris at the [facility] MS .  Patient scheduled for Sept 27 at 230 PM .   Message sent to supervisor to request opening a EZIO spot on Aug 8th at 12:30 pm.  MELODY Rodriguez., ANTIONETTE ([facility])

## 2024-07-18 ENCOUNTER — TELEPHONE (OUTPATIENT)
Dept: NEUROLOGY | Facility: CLINIC | Age: 84
End: 2024-07-18

## 2024-07-23 LAB — MAYO MISC RESULT: NORMAL

## 2024-07-24 ENCOUNTER — HOSPITAL ENCOUNTER (OUTPATIENT)
Dept: CT IMAGING | Facility: CLINIC | Age: 84
Discharge: HOME | End: 2024-07-24
Attending: INTERNAL MEDICINE | Admitting: INTERNAL MEDICINE
Payer: COMMERCIAL

## 2024-07-24 DIAGNOSIS — G03.9 MENINGITIS: ICD-10-CM

## 2024-07-24 DIAGNOSIS — G20.C PARKINSONISM, UNSPECIFIED PARKINSONISM TYPE (H): ICD-10-CM

## 2024-07-24 DIAGNOSIS — R41.3 MEMORY CHANGE: ICD-10-CM

## 2024-07-24 LAB
CREAT BLD-MCNC: 1.2 MG/DL (ref 0.7–1.3)
EGFRCR SERPLBLD CKD-EPI 2021: >60 ML/MIN/1.73M2

## 2024-07-24 PROCEDURE — 250N000011 HC RX IP 250 OP 636: Performed by: INTERNAL MEDICINE

## 2024-07-24 PROCEDURE — 82565 ASSAY OF CREATININE: CPT

## 2024-07-24 PROCEDURE — 71260 CT THORAX DX C+: CPT

## 2024-07-24 RX ORDER — IOPAMIDOL 755 MG/ML
90 INJECTION, SOLUTION INTRAVASCULAR ONCE
Status: COMPLETED | OUTPATIENT
Start: 2024-07-24 | End: 2024-07-24

## 2024-07-24 RX ADMIN — IOPAMIDOL 90 ML: 755 INJECTION, SOLUTION INTRAVENOUS at 14:08

## 2024-07-25 NOTE — OP NOTE
Date of Procedure: 6/17/2024  Perry Soni.  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 He underwent a high volume lumbar puncture at another institution, and he obtained some improvement in his gait. However, there was leukocytosis in the CSF  (over 60 WBC). Prior to placing a CSF shunt, he returns for another  lumbar puncture to check the elevated WBC count in the CSF.  The patient has extensive lumbar spondylosis and requires fluoroscopic guidance.     STAFF:  Ember Rsoales MD     FELLOW PHYSICIANS: Verónica Oro MD      SEDATION: The patient was placed on continuous monitoring. Intravenous  sedation was administered by the IR nursing staff under my  supervision. Vital signs and sedation were monitored by nursing staff.  The patient remained stable throughout the procedure.     SEDATION TIME: 82 minutes of 1:1 attending monitoring time     FLUORO TIME: 20.4 minutes.     FLUOROSCOPY DOSE: 610.4 mGy     MEDICATIONS:  1. 3 mg midazolam IV  2. 150 mcg fentanyl IV     TECHNIQUE: Verbal and written consent for lumbar puncture was obtained  from the patient. The patient was placed in the lateral position with  the right side up. A timeout was performed. The patient was sterilely  prepared and draped. Under fluoroscopic guidance, the interspinous  spaces were noted. There were extensive degenerative changes in the  lumbar spine. 1% lidocaine was administered for local anesthetic along  the trajectory of the spinal needle. A 22 gauge 3.5 inch needle was  advanced into the thecal sac under fluoroscopic guidance at the L1-L2  level.  There was return of clear CSF.      The needle was removed with the stylet in place. There was no  immediate complications associated with the procedure. Samples were  sent for the  requested laboratory testing.  A bandage was applied. The  patient was returned to the supine position and transported back to  the observation area.     INTERPRETATION OF IMAGES:     Lumbar spine, AP and lateral views:  These show 5 lumbar vertebrae with extensive degenerative changes and  reduced interspinous spaces. The spinal needle tip is in the spinal  canal at L1-L2.                                                                      IMPRESSION: Successful lumbar puncture under fluoroscopic guidance      PLAN: Patient discharged to patient care unit in stable condition for  monitoring. Orders placed for 1 hour of bed rest with patient laying  on the back and the head of the bed flat. Follow up after CSF results  return.    I was present for the entire procedure including the orellana portions.     Ember Rosales MD  Endovascular Surgical Neuroradiology Attending Physician

## 2024-07-26 ENCOUNTER — TELEPHONE (OUTPATIENT)
Dept: NEUROLOGY | Facility: CLINIC | Age: 84
End: 2024-07-26
Payer: COMMERCIAL

## 2024-07-26 NOTE — TELEPHONE ENCOUNTER
----- Message from Benoit Gill sent at 7/25/2024  9:35 PM CDT -----  Hi Team - Can we let patient know that CT scan does not show any concerning abnormalities. Also the blood test we had ordered at last visit ([place] encephalopathy panel) also came back normal. Thanks!

## 2024-07-29 ENCOUNTER — HOSPITAL ENCOUNTER (OUTPATIENT)
Dept: MRI IMAGING | Facility: CLINIC | Age: 84
Discharge: HOME | End: 2024-07-29
Attending: INTERNAL MEDICINE | Admitting: INTERNAL MEDICINE
Payer: COMMERCIAL

## 2024-07-29 ENCOUNTER — TELEPHONE (OUTPATIENT)
Dept: NEUROLOGY | Facility: CLINIC | Age: 84
End: 2024-07-29
Payer: COMMERCIAL

## 2024-07-29 DIAGNOSIS — G20.C PARKINSONISM, UNSPECIFIED PARKINSONISM TYPE (H): ICD-10-CM

## 2024-07-29 PROCEDURE — 70553 MRI BRAIN STEM W/O & W/DYE: CPT

## 2024-07-29 PROCEDURE — A9585 GADOBUTROL INJECTION: HCPCS | Performed by: INTERNAL MEDICINE

## 2024-07-29 PROCEDURE — 255N000002 HC RX 255 OP 636: Performed by: INTERNAL MEDICINE

## 2024-07-29 RX ORDER — GADOBUTROL 604.72 MG/ML
7 INJECTION INTRAVENOUS ONCE
Status: COMPLETED | OUTPATIENT
Start: 2024-07-29 | End: 2024-07-29

## 2024-07-29 RX ADMIN — GADOBUTROL 7 ML: 604.72 INJECTION INTRAVENOUS at 09:40

## 2024-07-30 NOTE — TELEPHONE ENCOUNTER
RECORDS RECEIVED FROM: internal   REASON FOR VISIT:   Memory change   G20.C (ICD-10-CM) - Parkinsonism, unspecified Parkinsonism type (H)   G03.9 (ICD-10-CM) - Meningitis      PROVIDER: Dr. Chely Shanks   DATE OF APPT: 8/7/24   NOTES (FOR ALL VISITS) STATUS DETAILS   OFFICE NOTE from referring provider Internal Dr Gill @ [facility] Neurology:  7/16/24 5/28/24   OFFICE NOTE from other specialist Care Everywhere  Dr Kwaku Wallis @  Neurology:  2/9/24    Dr Jorge Smith @ [facility] Neurology:  1/3/24   DISCHARGE SUMMARY from hospital Care Everywhere  [facility]:  12/10/23-12/12/23 12/12/22-12/14/22   DISCHARGE REPORT from the ER Care Everywhere  [facility]:  11/19/23   MEDICATION LIST Internal    IMAGING  (FOR ALL VISITS)     LUMBAR PUNCTURE PACS [facility]:  6/17/24 5/1/24    [facility]:  12/27/23   MRI (HEAD, NECK, SPINE) PACS [facility]:  MRI Brain 7/29/24    [facility]:  MRA Brain COW 1/23/24  MRA Neck Carotid 1/23/24  MRI Brain 1/5/24  MRI Cervical Spine 12/11/23  MRI Thoracic Spine 12/11/23  MRI Brain 12/13/22   CT (HEAD, NECK, SPINE) PACS [facility]:  CT Head 12/10/23  CT Head 11/19/23  CTA Head Neck 12/12/22  CT Head 12/12/22

## 2024-07-31 NOTE — TELEPHONE ENCOUNTER
----- Message from Benoit Gill sent at 7/29/2024  3:48 PM CDT -----  Hi Team - Can we call patient with the following message? The MRI of the brain doesn't show any new concerning findings compared to prior scans, which is good news. Our next step is to get Dr Shanks's opinion on his symptoms and I see he is scheduled for next week. Thanks!  
Called and left message. Want to discuss initial note.      
Called and left message. Want to discuss previous note.  
stated

## 2024-08-07 ENCOUNTER — PRE VISIT (OUTPATIENT)
Dept: NEUROLOGY | Facility: CLINIC | Age: 84
End: 2024-08-07
Payer: COMMERCIAL

## 2024-08-07 ENCOUNTER — OFFICE VISIT (OUTPATIENT)
Dept: NEUROLOGY | Facility: CLINIC | Age: 84
End: 2024-08-07
Attending: PSYCHIATRY & NEUROLOGY
Payer: COMMERCIAL

## 2024-08-07 VITALS
SYSTOLIC BLOOD PRESSURE: 153 MMHG | BODY MASS INDEX: 21.19 KG/M2 | HEART RATE: 75 BPM | WEIGHT: 148 LBS | OXYGEN SATURATION: 93 % | DIASTOLIC BLOOD PRESSURE: 85 MMHG | HEIGHT: 70 IN

## 2024-08-07 DIAGNOSIS — G95.9 MYELOPATHY (H): Primary | ICD-10-CM

## 2024-08-07 PROCEDURE — G0463 HOSPITAL OUTPT CLINIC VISIT: HCPCS | Performed by: PSYCHIATRY & NEUROLOGY

## 2024-08-07 PROCEDURE — 99417 PROLNG OP E/M EACH 15 MIN: CPT | Performed by: PSYCHIATRY & NEUROLOGY

## 2024-08-07 PROCEDURE — 99215 OFFICE O/P EST HI 40 MIN: CPT | Performed by: PSYCHIATRY & NEUROLOGY

## 2024-08-07 RX ORDER — DIAZEPAM 5 MG
TABLET ORAL
Qty: 3 TABLET | Refills: 0 | Status: SHIPPED | OUTPATIENT
Start: 2024-08-07

## 2024-08-07 ASSESSMENT — PAIN SCALES - GENERAL: PAINLEVEL: NO PAIN (0)

## 2024-08-07 NOTE — LETTER
8/7/2024       RE: Perry Soni  [address] [place] [address]     Dear Colleague,    Thank you for referring your patient, Perry Soni, to the [facility] MULTIPLE SCLEROSIS CLINIC [place] at [facility]. Please see a copy of my visit note below.    Date of Service: 8/7/2024    [facility]   MS Clinic Evaluation    Subjective: 83-year-old man relatively healthy at baseline evaluation with gait.    He was referred to me by Dr. Gill given the presence of inflammatory cells in the CSF.  There is a specific question as to whether or not he may have sarcoidosis.    He recalls that in the summer 2022 he was on vacation.  Around that time he started to notice gait instability.  He was still able to walk approximately 1 mile without a gait aid, but his partner notes that he would have to stop many times he would prefer to hold onto things to maintain his balance.  Over time his gait has progressively declined.  He notes that his balance is poor and he is shuffling his feet.  Even if he walks 15 feet he will need to stop to hold onto something to prevent a fall.  He is still able to do stairs, but appears quite precarious.  The past year he has used a wheelchair for longer distances.  He has only had 1 fall over the past year that occurred when going downstairs.  He fell forward onto the right side.    He does endorse urinary dysfunction.  He has suffered from significant frequency and urgency, though notes that lately he has had less urgency.  He had an episode of urinary retention and April.  This required catheterization.  He was successful in getting this removed.    Has had some bowel constipation in the past few months.  He can go 3 to 4 days without a bowel movement, but is responsive to Dulcolax.    He denies any numbness and tingling in the legs.  He does not experience muscle spasms in the legs.    He has also experienced tremor.   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  However, it does seem that the tremor has intensified in the past couple years.  It is present with rest and with activity.    He has also observed some short-term memory loss, though notes that his memory loss is on par for his peers.    He has had an approximate 10 pound weight loss over the past year.  In the past weight loss has been associated with physical activity, though he has been less physically active in the last 6 months due to his gait instability.  He denies any fevers or night sweats.  He denies any major joint pains or joint swelling.  He does have a persistent cough has been present for the past 2 years, but reports that it is productive.  His wife observes the cough as something that occurs with swallowing.  He denies Lhermitte's phenomenon.    Allergies   Allergen Reactions     Wellbutrin [Bupropion Hcl] Anxiety       Current Outpatient Medications   Medication Sig Dispense Refill     acetaminophen 650 MG TABS Take 650 mg by mouth every 6 hours as needed for mild pain 100 tablet      aspirin (ASA) 81 MG chewable tablet Take 81 mg by mouth       atorvastatin (LIPITOR) 40 MG tablet Take 40 mg by mouth every evening       diphenhydrAMINE-acetaminophen (TYLENOL PM EXTRA STRENGTH)  MG tablet Take 1 tablet by mouth nightly as needed       FLUoxetine HCl (PROZAC PO) Take 10 mg by mouth daily        fluticasone (FLONASE) 50 MCG/ACT nasal spray 2 sprays       tamsulosin (FLOMAX) 0.4 MG capsule Take 2 capsules by mouth daily       acyclovir (ZOVIRAX) 5 % ointment Apply 0.5 inches topically 5 times daily. PRN (Patient not taking: Reported on 8/7/2024)       amLODIPine (NORVASC) 5 MG tablet Take 5 mg by mouth daily (Patient not taking: Reported on 8/7/2024)       carbidopa-levodopa (SINEMET)  MG tablet Take 1 tablet by mouth 3 times daily (Patient not taking: Reported on  "8/7/2024) 270 tablet 1     LORazepam (ATIVAN) 0.5 MG tablet If needed, take 1 tablet 30 minutes prior to CT scan / Take 1 tablet 30 minutes prior to MRI. Can take 2nd tablet immediately before MRI if needed. (Patient not taking: Reported on 8/7/2024) 3 tablet 0     No current facility-administered medications for this visit.        Past medical, surgical, social and family history was personally reviewed. Pertinent details noted above.     Physical Examination:   BP (!) 153/85 (BP Location: Left arm, Patient Position: Sitting, Cuff Size: Adult Regular)   Pulse 75   Ht 1.778 m (5' 10\")   Wt 67.1 kg (148 lb)   SpO2 93%   BMI 21.24 kg/m      General: no acute distress  Cranial nerves:   VFFC  PERRL w/no RAPD  EOM full w/no NONI smooth pursuit choppy  Face symmetric  Hearing intact  No dysarthria   Motor:   Tone is increased some paratonia   Bulk is normal for age  Resting < postural tremor    R L  Deltoid  5 5  Biceps  5 5  Triceps 5 5  Wrist ext 5 5  Finger ext 5 5  Finger abd 5 5    Hip flexion 4 5-  Knee flexion 5 5  Knee ext 5 5  Ankle d/f 5 5    Reflexes:preserved in lower extremities and brisk in right leg, babinski absent bilaterally  Sensory: vibration is moderately reduced in the toes, JPS mildly reduced in the R toe   Romberg is present  Coordination:mild/mod ataxia of lower extremities  Gait: wide based gait with slight swing of right leg     Tests/Imaging:   CSF 65 wbc, lymphocytic   Pro 77  Glu 38  Flow cytometry polytypic b cells  Cytology - mixed acute and chronic inflammatory cells, neg malignancy  12 ocb  Elevated igg index     [place] autoimmune panel neg from serum      Ct c/a/p neg for malignancy or sarcoidosis     MRI Brain  7/2024-notable for non specific deep white matter hyperintensities rather confluent, atrophy noted w prominence of ventricles, no abnormal gadolinium enhancement including no evidence of leptomeningeal enhancement, no significant change when compared to 1/2024    MRI Cervical " spine   12/2023-stenosis c5-6, no abnormal gd enhancement     MRI Thoracic spine   12/2023 - normal appearing thoracic spinal cord, no abnormal gd enhancement     Assessment: 83-year-old man who has experienced a chronic progressive decline in gait. Clinical examination reveals weakness in the hip flexors with brisk reflexes particularly in the weaker leg. Constellation of findings is suggestive of myelopathy.     We discussed two probable diagnoses today.  Cervical spondylotic myelopathy vs sarcoid myelopathy. Spondylosis is not expected to cause an inflammatory csf pattern, but sarcoidosis would. MRI images does not reveal myelitis or leptomeningeal enhancement.     He has clinically declined in the past six months, so have advised updated MRI. Patient agreeable.     Plan:   - mri cervical and thoracic spine     Note was completed with the assistance of Dragon Fluency software which can often result in accidental word substitutions.     A total of 70 minutes on the date of service were spent in the care of this patient.   Jessie Shanks MD on 8/7/2024 at 12:47 PM        Again, thank you for allowing me to participate in the care of your patient.      Sincerely,    Jessie Shanks MD

## 2024-08-07 NOTE — NURSING NOTE
Chief Complaint   Patient presents with    MS    New Patient     Establishing MS      Vitals were taken and medications were reconciled.   Jonny Thomas, EMT  12:25 PM

## 2024-08-07 NOTE — PROGRESS NOTES
Date of Service: 8/7/2024    [facility]   MS Clinic Evaluation    Subjective: 83-year-old man relatively healthy at baseline evaluation with gait.    He was referred to me by Dr. Gill given the presence of inflammatory cells in the CSF.  There is a specific question as to whether or not he may have sarcoidosis.    He recalls that in the summer 2022 he was on vacation.  Around that time he started to notice gait instability.  He was still able to walk approximately 1 mile without a gait aid, but his partner notes that he would have to stop many times he would prefer to hold onto things to maintain his balance.  Over time his gait has progressively declined.  He notes that his balance is poor and he is shuffling his feet.  Even if he walks 15 feet he will need to stop to hold onto something to prevent a fall.  He is still able to do stairs, but appears quite precarious.  The past year he has used a wheelchair for longer distances.  He has only had 1 fall over the past year that occurred when going downstairs.  He fell forward onto the right side.    He does endorse urinary dysfunction.  He has suffered from significant frequency and urgency, though notes that lately he has had less urgency.  He had an episode of urinary retention and April.  This required catheterization.  He was successful in getting this removed.    Has had some bowel constipation in the past few months.  He can go 3 to 4 days without a bowel movement, but is responsive to Dulcolax.    He denies any numbness and tingling in the legs.  He does not experience muscle spasms in the legs.    He has also experienced tremor.  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  However, it does seem that the tremor has intensified in the past couple years.  It is present with rest and with activity.    He has also observed some short-term memory loss, though notes that his  memory loss is on par for his peers.    He has had an approximate 10 pound weight loss over the past year.  In the past weight loss has been associated with physical activity, though he has been less physically active in the last 6 months due to his gait instability.  He denies any fevers or night sweats.  He denies any major joint pains or joint swelling.  He does have a persistent cough has been present for the past 2 years, but reports that it is productive.  His wife observes the cough as something that occurs with swallowing.  He denies Lhermitte's phenomenon.    Allergies   Allergen Reactions    Wellbutrin [Bupropion Hcl] Anxiety       Current Outpatient Medications   Medication Sig Dispense Refill    acetaminophen 650 MG TABS Take 650 mg by mouth every 6 hours as needed for mild pain 100 tablet     aspirin (ASA) 81 MG chewable tablet Take 81 mg by mouth      atorvastatin (LIPITOR) 40 MG tablet Take 40 mg by mouth every evening      diphenhydrAMINE-acetaminophen (TYLENOL PM EXTRA STRENGTH)  MG tablet Take 1 tablet by mouth nightly as needed      FLUoxetine HCl (PROZAC PO) Take 10 mg by mouth daily       fluticasone (FLONASE) 50 MCG/ACT nasal spray 2 sprays      tamsulosin (FLOMAX) 0.4 MG capsule Take 2 capsules by mouth daily      acyclovir (ZOVIRAX) 5 % ointment Apply 0.5 inches topically 5 times daily. PRN (Patient not taking: Reported on 8/7/2024)      amLODIPine (NORVASC) 5 MG tablet Take 5 mg by mouth daily (Patient not taking: Reported on 8/7/2024)      carbidopa-levodopa (SINEMET)  MG tablet Take 1 tablet by mouth 3 times daily (Patient not taking: Reported on 8/7/2024) 270 tablet 1    LORazepam (ATIVAN) 0.5 MG tablet If needed, take 1 tablet 30 minutes prior to CT scan / Take 1 tablet 30 minutes prior to MRI. Can take 2nd tablet immediately before MRI if needed. (Patient not taking: Reported on 8/7/2024) 3 tablet 0     No current facility-administered medications for this visit.        Past  "medical, surgical, social and family history was personally reviewed. Pertinent details noted above.     Physical Examination:   BP (!) 153/85 (BP Location: Left arm, Patient Position: Sitting, Cuff Size: Adult Regular)   Pulse 75   Ht 1.778 m (5' 10\")   Wt 67.1 kg (148 lb)   SpO2 93%   BMI 21.24 kg/m      General: no acute distress  Cranial nerves:   VFFC  PERRL w/no RAPD  EOM full w/no NONI smooth pursuit choppy  Face symmetric  Hearing intact  No dysarthria   Motor:   Tone is increased some paratonia   Bulk is normal for age  Resting < postural tremor    R L  Deltoid  5 5  Biceps  5 5  Triceps 5 5  Wrist ext 5 5  Finger ext 5 5  Finger abd 5 5    Hip flexion 4 5-  Knee flexion 5 5  Knee ext 5 5  Ankle d/f 5 5    Reflexes:preserved in lower extremities and brisk in right leg, babinski absent bilaterally  Sensory: vibration is moderately reduced in the toes, JPS mildly reduced in the R toe   Romberg is present  Coordination:mild/mod ataxia of lower extremities  Gait: wide based gait with slight swing of right leg     Tests/Imaging:   CSF 65 wbc, lymphocytic   Pro 77  Glu 38  Flow cytometry polytypic b cells  Cytology - mixed acute and chronic inflammatory cells, neg malignancy  12 ocb  Elevated igg index     [place] autoimmune panel neg from serum      Ct c/a/p neg for malignancy or sarcoidosis     MRI Brain  7/2024-notable for non specific deep white matter hyperintensities rather confluent, atrophy noted w prominence of ventricles, no abnormal gadolinium enhancement including no evidence of leptomeningeal enhancement, no significant change when compared to 1/2024    MRI Cervical spine   12/2023-stenosis c5-6, no abnormal gd enhancement     MRI Thoracic spine   12/2023 - normal appearing thoracic spinal cord, no abnormal gd enhancement     Assessment: 83-year-old man who has experienced a chronic progressive decline in gait. Clinical examination reveals weakness in the hip flexors with brisk reflexes particularly " in the weaker leg. Constellation of findings is suggestive of myelopathy.     We discussed two probable diagnoses today.  Cervical spondylotic myelopathy vs sarcoid myelopathy. Spondylosis is not expected to cause an inflammatory csf pattern, but sarcoidosis would. MRI images does not reveal myelitis or leptomeningeal enhancement.     He has clinically declined in the past six months, so have advised updated MRI. Patient agreeable.     Plan:   - mri cervical and thoracic spine     Note was completed with the assistance of Dragon Fluency software which can often result in accidental word substitutions.     A total of 70 minutes on the date of service were spent in the care of this patient.   Jessie Shanks MD on 8/7/2024 at 12:47 PM

## 2024-08-07 NOTE — PATIENT INSTRUCTIONS
You have weakness in your legs and jumpy reflexes   This is a sign of a spinal cord condition     Sarcoidosis can cause spinal cord inflammation, but we have not seen this inflammation on your MRIs    You also have a herniated disc in your neck that could be causing this weakness/imbalance, but would not cause the inflammation in your spinal fluid     Update MRI of the spinal cord     I will touch base with you after this is completed

## 2024-08-08 ENCOUNTER — DOCUMENTATION ONLY (OUTPATIENT)
Dept: NEUROLOGY | Facility: CLINIC | Age: 84
End: 2024-08-08
Payer: COMMERCIAL

## 2024-08-08 NOTE — PROGRESS NOTES
Cervical and Thoracic MRI have been printed and faxed over to [facility] [place] [facility]  Phone: [phone]  Ax: [phone].  Jonny Thomas EMT August 8, 2024

## 2024-08-19 NOTE — PROGRESS NOTES
MRI reports received from [facility], reports placed in Dr. Shanks's folder for review and signature.   Jonny Thomas EMT August 19, 2024

## 2024-09-04 ENCOUNTER — DOCUMENTATION ONLY (OUTPATIENT)
Dept: NEUROLOGY | Facility: CLINIC | Age: 84
End: 2024-09-04

## 2024-09-04 ENCOUNTER — ANCILLARY PROCEDURE (OUTPATIENT)
Dept: GENERAL RADIOLOGY | Facility: CLINIC | Age: 84
End: 2024-09-04
Attending: PSYCHIATRY & NEUROLOGY
Payer: COMMERCIAL

## 2024-09-04 ENCOUNTER — OFFICE VISIT (OUTPATIENT)
Dept: NEUROLOGY | Facility: CLINIC | Age: 84
End: 2024-09-04
Attending: PSYCHIATRY & NEUROLOGY
Payer: COMMERCIAL

## 2024-09-04 VITALS
WEIGHT: 140.6 LBS | BODY MASS INDEX: 20.17 KG/M2 | DIASTOLIC BLOOD PRESSURE: 69 MMHG | OXYGEN SATURATION: 100 % | SYSTOLIC BLOOD PRESSURE: 119 MMHG | HEART RATE: 104 BPM

## 2024-09-04 DIAGNOSIS — M47.12 CERVICAL SPONDYLOSIS WITH MYELOPATHY: ICD-10-CM

## 2024-09-04 DIAGNOSIS — M47.12 CERVICAL SPONDYLOSIS WITH MYELOPATHY: Primary | ICD-10-CM

## 2024-09-04 PROCEDURE — 99214 OFFICE O/P EST MOD 30 MIN: CPT | Performed by: PSYCHIATRY & NEUROLOGY

## 2024-09-04 PROCEDURE — G0463 HOSPITAL OUTPT CLINIC VISIT: HCPCS | Performed by: PSYCHIATRY & NEUROLOGY

## 2024-09-04 PROCEDURE — 72050 X-RAY EXAM NECK SPINE 4/5VWS: CPT | Mod: GC | Performed by: RADIOLOGY

## 2024-09-04 ASSESSMENT — PAIN SCALES - GENERAL: PAINLEVEL: NO PAIN (0)

## 2024-09-04 NOTE — NURSING NOTE
Chief Complaint   Patient presents with    MS    RECHECK     MS follow up      Vitals were taken and medications were reconciled.   Jonny Thomas, EMT  12:30 PM

## 2024-09-04 NOTE — LETTER
9/4/2024       RE: Perry Soni  [address] [place] [address]     Dear Colleague,    Thank you for referring your patient, Perry Soni, to the [facility] MULTIPLE SCL[facility]OSIS CLINIC [place] at [facility]. Please see a copy of my visit note below.    Date of Service: 9/4/2024    [facility]   MS Clinic Evaluation    Subjective: 83-year-old man relatively healthy at baseline evaluation who presents in follow up for gait instability in the setting of inflammatory csf.     He has experienced a further decline in weight.  His weight is down to 140.  He denies any early satiety.  Their daughter, who lives with them, is a vegetarian.  This makes him less interested in some of the meals that are prepared.  His wife did encourage him to try protein shakes, though he has not sustained daily consumption of these.    He has experienced an increase in neck pain and feels that his balance has gotten worse.  He reports that his urinary symptoms are stable.    He has experienced an increase in tinnitus, but also notes that this is a chronic phenomenon.    His tremor seems stable.    He reports an increase in fatigue.  He is sleeping 10 to 11 hours per night.  He does find his sleep to be restorative.  Energy during the daytime is variable.    Allergies   Allergen Reactions     Wellbutrin [Bupropion Hcl] Anxiety       Current Outpatient Medications   Medication Sig Dispense Refill     acetaminophen 650 MG TABS Take 650 mg by mouth every 6 hours as needed for mild pain 100 tablet      aspirin (ASA) 81 MG chewable tablet Take 81 mg by mouth       atorvastatin (LIPITOR) 40 MG tablet Take 40 mg by mouth every evening       diazepam (VALIUM) 5 MG tablet Take 1-2 tablets 30 minutes before MRI, 3rd tablet if needed. No driving for 8 hours after taking. 3 tablet 0     diphenhydrAMINE-acetaminophen (TYLENOL PM EXTRA STRENGTH)  MG tablet Take 1 tablet by  mouth nightly as needed       FLUoxetine HCl (PROZAC PO) Take 10 mg by mouth daily        fluticasone (FLONASE) 50 MCG/ACT nasal spray 2 sprays       acyclovir (ZOVIRAX) 5 % ointment Apply 0.5 inches topically 5 times daily. PRN (Patient not taking: Reported on 8/7/2024)       amLODIPine (NORVASC) 5 MG tablet Take 5 mg by mouth daily (Patient not taking: Reported on 8/7/2024)       carbidopa-levodopa (SINEMET)  MG tablet Take 1 tablet by mouth 3 times daily (Patient not taking: Reported on 8/7/2024) 270 tablet 1     LORazepam (ATIVAN) 0.5 MG tablet If needed, take 1 tablet 30 minutes prior to CT scan / Take 1 tablet 30 minutes prior to MRI. Can take 2nd tablet immediately before MRI if needed. (Patient not taking: Reported on 8/7/2024) 3 tablet 0     tamsulosin (FLOMAX) 0.4 MG capsule Take 2 capsules by mouth daily       No current facility-administered medications for this visit.        Past medical, surgical, social and family history was personally reviewed. Pertinent details noted above.     Physical Examination:   /69 (BP Location: Left arm, Patient Position: Sitting, Cuff Size: Adult Small)   Pulse 104   Wt 63.8 kg (140 lb 9.6 oz)   SpO2 100%   BMI 20.17 kg/m      General: no acute distress    Tests/Imaging:   CSF 65 wbc, lymphocytic   Pro 77  Glu 38  Flow cytometry polytypic b cells  Cytology - mixed acute and chronic inflammatory cells, neg malignancy  12 ocb  Elevated igg index     [place] autoimmune panel neg from serum      Ct c/a/p neg for malignancy or sarcoidosis     MRI Brain  7/2024-notable for non specific deep white matter hyperintensities rather confluent, atrophy noted w prominence of ventricles, no abnormal gadolinium enhancement including no evidence of leptomeningeal enhancement, no significant change when compared to 1/2024    MRI Cervical spine   12/2023-stenosis c5-6, no abnormal gd enhancement   8/2024 - stenosis most notable at c5-6, no evidence of myelitis     MRI Thoracic  spine   12/2023 - normal appearing thoracic spinal cord, no abnormal gd enhancement   8/2024 - no myelitis    Assessment: 83-year-old man who has experienced a chronic progressive decline in gait. Clinical examination is consistent with myelopathy. CSF is noted to have inflammation, but there are no inflammatory lesions noted on MRI.  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  A visit with neurosurgery is advised.  X-ray to assess for instability at the level of c5-6 is advised.     Encouraged adequate caloric intake to maintain weight.  Importance of maintaining weight was discussed.     Plan:   - neurosurgery referral   - xray c spine   - follow up in 3 months    Note was completed with the assistance of Dragon Fluency software which can often result in accidental word substitutions.     A total of 30 minutes on the date of service were spent in the care of this patient.   Jessie Shanks MD on 9/4/2024 at 1:08 PM          Again, thank you for allowing me to participate in the care of your patient.      Sincerely,    Jessie Shanks MD

## 2024-09-04 NOTE — PROGRESS NOTES
Disability parking permit has been printed, filled out & signed. Forms were sent home with the patient.   Jonny Thomas EMT September 4, 2024

## 2024-09-04 NOTE — PROGRESS NOTES
Date of Service: 9/4/2024    [facility]   MS Clinic Evaluation    Subjective: 83-year-old man relatively healthy at baseline evaluation who presents in follow up for gait instability in the setting of inflammatory csf.     He has experienced a further decline in weight.  His weight is down to 140.  He denies any early satiety.  Their daughter, who lives with them, is a vegetarian.  This makes him less interested in some of the meals that are prepared.  His wife did encourage him to try protein shakes, though he has not sustained daily consumption of these.    He has experienced an increase in neck pain and feels that his balance has gotten worse.  He reports that his urinary symptoms are stable.    He has experienced an increase in tinnitus, but also notes that this is a chronic phenomenon.    His tremor seems stable.    He reports an increase in fatigue.  He is sleeping 10 to 11 hours per night.  He does find his sleep to be restorative.  Energy during the daytime is variable.    Allergies   Allergen Reactions    Wellbutrin [Bupropion Hcl] Anxiety       Current Outpatient Medications   Medication Sig Dispense Refill    acetaminophen 650 MG TABS Take 650 mg by mouth every 6 hours as needed for mild pain 100 tablet     aspirin (ASA) 81 MG chewable tablet Take 81 mg by mouth      atorvastatin (LIPITOR) 40 MG tablet Take 40 mg by mouth every evening      diazepam (VALIUM) 5 MG tablet Take 1-2 tablets 30 minutes before MRI, 3rd tablet if needed. No driving for 8 hours after taking. 3 tablet 0    diphenhydrAMINE-acetaminophen (TYLENOL PM EXTRA STRENGTH)  MG tablet Take 1 tablet by mouth nightly as needed      FLUoxetine HCl (PROZAC PO) Take 10 mg by mouth daily       fluticasone (FLONASE) 50 MCG/ACT nasal spray 2 sprays      acyclovir (ZOVIRAX) 5 % ointment Apply 0.5 inches topically 5 times daily. PRN (Patient not taking: Reported on 8/7/2024)      amLODIPine (NORVASC) 5 MG tablet Take 5 mg by mouth daily  (Patient not taking: Reported on 8/7/2024)      carbidopa-levodopa (SINEMET)  MG tablet Take 1 tablet by mouth 3 times daily (Patient not taking: Reported on 8/7/2024) 270 tablet 1    LORazepam (ATIVAN) 0.5 MG tablet If needed, take 1 tablet 30 minutes prior to CT scan / Take 1 tablet 30 minutes prior to MRI. Can take 2nd tablet immediately before MRI if needed. (Patient not taking: Reported on 8/7/2024) 3 tablet 0    tamsulosin (FLOMAX) 0.4 MG capsule Take 2 capsules by mouth daily       No current facility-administered medications for this visit.        Past medical, surgical, social and family history was personally reviewed. Pertinent details noted above.     Physical Examination:   /69 (BP Location: Left arm, Patient Position: Sitting, Cuff Size: Adult Small)   Pulse 104   Wt 63.8 kg (140 lb 9.6 oz)   SpO2 100%   BMI 20.17 kg/m      General: no acute distress    Tests/Imaging:   CSF 65 wbc, lymphocytic   Pro 77  Glu 38  Flow cytometry polytypic b cells  Cytology - mixed acute and chronic inflammatory cells, neg malignancy  12 ocb  Elevated igg index     [place] autoimmune panel neg from serum      Ct c/a/p neg for malignancy or sarcoidosis     MRI Brain  7/2024-notable for non specific deep white matter hyperintensities rather confluent, atrophy noted w prominence of ventricles, no abnormal gadolinium enhancement including no evidence of leptomeningeal enhancement, no significant change when compared to 1/2024    MRI Cervical spine   12/2023-stenosis c5-6, no abnormal gd enhancement   8/2024 - stenosis most notable at c5-6, no evidence of myelitis     MRI Thoracic spine   12/2023 - normal appearing thoracic spinal cord, no abnormal gd enhancement   8/2024 - no myelitis    Assessment: 83-year-old man who has experienced a chronic progressive decline in gait. Clinical examination is consistent with myelopathy. CSF is noted to have inflammation, but there are no inflammatory lesions noted on MRI.   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  A visit with neurosurgery is advised.  X-ray to assess for instability at the level of c5-6 is advised.     Encouraged adequate caloric intake to maintain weight.  Importance of maintaining weight was discussed.     Plan:   - neurosurgery referral   - xray c spine   - follow up in 3 months    Note was completed with the assistance of Dragon Fluency software which can often result in accidental word substitutions.     A total of 30 minutes on the date of service were spent in the care of this patient.   Jessie Shanks MD on 9/4/2024 at 1:08 PM

## 2024-09-05 NOTE — RESULT ENCOUNTER NOTE
Please notify Perry that his x-rays did not reveal evidence of cervical spine instability   Jessie Shanks MD on 9/5/2024 at 1:12 PM

## 2024-09-06 NOTE — TELEPHONE ENCOUNTER
Action 9/6/24 MV 10.58am   Action Taken [facility] images resolved in PACS       SPINE PATIENTS - NEW PROTOCOL PREVISIT    RECORDS RECEIVED FROM: internal   REASON FOR VISIT: Spine, Cervical spondylosis with myelopathy,    PROVIDER: Dr. Fowler   DATE OF APPT: 9/10/24   NOTES (FOR ALL VISITS) STATUS DETAILS   OFFICE NOTE from referring provider Internal Dr Chely Shanks @ [facility]:  9/4/24   MEDICATION LIST Internal    IMAGING  (FOR ALL VISITS)     MRI (HEAD, NECK, SPINE) PACS [facility]:  MRI Cervical Spine 8/16/24   MRI Thoracic Spine 8/16/24    [facility]:  MRI Cervical Spine 12/10/23  MRI Thoracic Spine 12/10/23   XRAY (SPINE) *NEUROSURGERY* PACS [facility]:  XR Cervical Spine 9/4/24

## 2024-09-09 NOTE — TELEPHONE ENCOUNTER
SPINE PATIENTS - NEW PROTOCOL PREVISIT    RECORDS RECEIVED FROM: Referred by Jessie Shanks MD   REASON FOR VISIT: Spine, Cervical spondylosis with myelopathy   PROVIDER: Rina   DATE OF APPT: 09/11/2024   NOTES (FOR ALL VISITS) STATUS DETAILS   OFFICE NOTE from referring provider Internal Referral and notes in chart   OFFICE NOTE from other specialist N/A    DISCHARGE SUMMARY from hospital N/A    DISCHARGE REPORT from ER N/A    OPERATIVE REPORT N/A    EMG REPORT N/A    Injection N/A    Physical therapy Internal PT last completed 07/02/2024   IMAGING  (FOR ALL VISITS)     MRI (HEAD, NECK, SPINE) Received MR Cervical 08/16/2024 (in PACs), 01/24/2024 and 12/11/2023   XRAY (SPINE) *NEUROSURGERY* Internal XR cervical 09/04/2024   CT (HEAD, NECK, SPINE) N/A

## 2024-09-10 ENCOUNTER — PRE VISIT (OUTPATIENT)
Dept: NEUROSURGERY | Facility: CLINIC | Age: 84
End: 2024-09-10

## 2024-09-11 ENCOUNTER — OFFICE VISIT (OUTPATIENT)
Dept: NEUROSURGERY | Facility: CLINIC | Age: 84
End: 2024-09-11
Attending: PSYCHIATRY & NEUROLOGY
Payer: COMMERCIAL

## 2024-09-11 ENCOUNTER — PRE VISIT (OUTPATIENT)
Dept: NEUROSURGERY | Facility: CLINIC | Age: 84
End: 2024-09-11

## 2024-09-11 VITALS
HEART RATE: 85 BPM | SYSTOLIC BLOOD PRESSURE: 129 MMHG | HEIGHT: 70 IN | OXYGEN SATURATION: 96 % | DIASTOLIC BLOOD PRESSURE: 69 MMHG | BODY MASS INDEX: 20.13 KG/M2 | WEIGHT: 140.6 LBS

## 2024-09-11 DIAGNOSIS — M47.12 CERVICAL SPONDYLOSIS WITH MYELOPATHY: ICD-10-CM

## 2024-09-11 PROCEDURE — 99215 OFFICE O/P EST HI 40 MIN: CPT | Performed by: STUDENT IN AN ORGANIZED HEALTH CARE EDUCATION/TRAINING PROGRAM

## 2024-09-11 PROCEDURE — G0463 HOSPITAL OUTPT CLINIC VISIT: HCPCS | Performed by: STUDENT IN AN ORGANIZED HEALTH CARE EDUCATION/TRAINING PROGRAM

## 2024-09-11 PROCEDURE — 99417 PROLNG OP E/M EACH 15 MIN: CPT | Performed by: STUDENT IN AN ORGANIZED HEALTH CARE EDUCATION/TRAINING PROGRAM

## 2024-09-11 ASSESSMENT — PAIN SCALES - GENERAL: PAINLEVEL: MILD PAIN (3)

## 2024-09-11 NOTE — PROGRESS NOTES
HPI:  83-year-old male with a history of 1 year of imbalance.  He feels as though his legs do not allow him to have normal balance but he does not feel as though the room is spinning causing balance issues.  He denies any weakness or numbness in his legs.  He denies any weakness or numbness in his arms.  He denies any incoordination of his hands.  He does have a tremor in both arms which is worse on the right than the left.  He does have a history of multiple lumbar punctures with the first 1 improving his gait symptoms however the second and third 1 did not seem to improve it very much.  He does question if they took very much spinal fluid during the second and third trials however.  He denies any difficulty with fine motor tasks in his hands including handwriting or doing buttons or having any trouble with holding onto things.  He does have some neck pain which is on the backside of his neck without any dermatomal pattern into his arms.  He denies not have any radiation to his arms.  Current Outpatient Medications   Medication Sig Dispense Refill    acetaminophen 650 MG TABS Take 650 mg by mouth every 6 hours as needed for mild pain 100 tablet     aspirin (ASA) 81 MG chewable tablet Take 81 mg by mouth      atorvastatin (LIPITOR) 40 MG tablet Take 40 mg by mouth every evening      diazepam (VALIUM) 5 MG tablet Take 1-2 tablets 30 minutes before MRI, 3rd tablet if needed. No driving for 8 hours after taking. 3 tablet 0    diphenhydrAMINE-acetaminophen (TYLENOL PM EXTRA STRENGTH)  MG tablet Take 1 tablet by mouth nightly as needed      FLUoxetine HCl (PROZAC PO) Take 10 mg by mouth daily       fluticasone (FLONASE) 50 MCG/ACT nasal spray 2 sprays      LORazepam (ATIVAN) 0.5 MG tablet If needed, take 1 tablet 30 minutes prior to CT scan / Take 1 tablet 30 minutes prior to MRI. Can take 2nd tablet immediately before MRI if needed. 3 tablet 0    tamsulosin (FLOMAX) 0.4 MG capsule Take 2 capsules by mouth daily    "   acyclovir (ZOVIRAX) 5 % ointment Apply 0.5 inches topically 5 times daily. PRN (Patient not taking: Reported on 8/7/2024)      amLODIPine (NORVASC) 5 MG tablet Take 5 mg by mouth daily (Patient not taking: Reported on 8/7/2024)      carbidopa-levodopa (SINEMET)  MG tablet Take 1 tablet by mouth 3 times daily (Patient not taking: Reported on 8/7/2024) 270 tablet 1     No current facility-administered medications for this visit.      Physical Exam:  Vital signs:      BP: 129/69 Pulse: 85     SpO2: 96 %     Height: 177.8 cm (5' 10\") Weight: 63.8 kg (140 lb 9.6 oz)  Estimated body mass index is 20.17 kg/m  as calculated from the following:    Height as of this encounter: 1.778 m (5' 10\").    Weight as of this encounter: 63.8 kg (140 lb 9.6 oz).  He has full strength in his bilateral upper extremities.  Sensation is intact to light touch throughout.  He has full strength in his bilateral lower extremities as well.  No Onel sign present.  Patellar reflexes are 3+ bilaterally.  Results Reviewed:  I personally viewed the images of an MRI of the cervical spine and brain.  The MRI of the brain shows enlarged ventricles compared to expected for his age.  The MRI of the cervical spine shows a C5-6 disc bulge with C5-6 and C6-7 posterior ligamentum hypertrophy causing stenosis at the C5-6 disc level.  No significant cord compression or signal change.  Assessment:  83-year-old male with imbalance.  His symptoms do not sound like vertigo as he does not have any spinning of the room associated with the imbalance.  He denies any upper extremity symptoms consistent with myelopathy and only remains with balance as a possible myelopathy symptoms.  He also does have increased reflexes in his lower extremities which could be consistent with myelopathy as well.  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  I do believe NPH is most likely the cause right now however cannot rule out myelopathy as a cause either.  Plan:  At this time I would consider doing a more definitive trial of a lumbar drain with PT to evaluate if this does significantly improve the symptoms.  This however does not establish diagnosis of NPH as it only establish whether the treatment of a  shunt would improve the symptoms.  Should the symptoms not improve with a lumbar drain trial we could consider doing a C5-6 ACDF.  Even with this however I cannot guarantee that his symptoms would stop or get better with his balance after an ACDF as I am not certain that myelopathy is the correct diagnosis.  Should he fail NPH testing and want to be considered for a cervical fusion in the future I will have him come back to discuss that with me again.    I spent 70 minutes reviewing the patient's chart, interviewing examining the patient as well as discussing diagnosis and treatment options.    Kennedy Flynn MD

## 2024-09-11 NOTE — NURSING NOTE
"Perry Soni is a 83 year old male who presents for:  Chief Complaint   Patient presents with    Consult     Spine, Cervical spondylosis with myelopathy        Initial Vitals:  /69   Pulse 85   Ht 5' 10\" (1.778 m)   Wt 140 lb 9.6 oz (63.8 kg)   SpO2 96%   BMI 20.17 kg/m   Estimated body mass index is 20.17 kg/m  as calculated from the following:    Height as of this encounter: 5' 10\" (1.778 m).    Weight as of this encounter: 140 lb 9.6 oz (63.8 kg).. Body surface area is 1.78 meters squared. BP completed using cuff size: regular  Mild Pain (3)        Milka Pagan   "

## 2024-09-11 NOTE — LETTER
9/11/2024      Perry Schmitzst  [address] [place] [address]      Dear Colleague,    Thank you for referring your patient, Perry Soni, to the [facility] [place]. Please see a copy of my visit note below.    HPI:  83-year-old male with a history of 1 year of imbalance.  He feels as though his legs do not allow him to have normal balance but he does not feel as though the room is spinning causing balance issues.  He denies any weakness or numbness in his legs.  He denies any weakness or numbness in his arms.  He denies any incoordination of his hands.  He does have a tremor in both arms which is worse on the right than the left.  He does have a history of multiple lumbar punctures with the first 1 improving his gait symptoms however the second and third 1 did not seem to improve it very much.  He does question if they took very much spinal fluid during the second and third trials however.  He denies any difficulty with fine motor tasks in his hands including handwriting or doing buttons or having any trouble with holding onto things.  He does have some neck pain which is on the backside of his neck without any dermatomal pattern into his arms.  He denies not have any radiation to his arms.  Current Outpatient Medications   Medication Sig Dispense Refill     acetaminophen 650 MG TABS Take 650 mg by mouth every 6 hours as needed for mild pain 100 tablet      aspirin (ASA) 81 MG chewable tablet Take 81 mg by mouth       atorvastatin (LIPITOR) 40 MG tablet Take 40 mg by mouth every evening       diazepam (VALIUM) 5 MG tablet Take 1-2 tablets 30 minutes before MRI, 3rd tablet if needed. No driving for 8 hours after taking. 3 tablet 0     diphenhydrAMINE-acetaminophen (TYLENOL PM EXTRA STRENGTH)  MG tablet Take 1 tablet by mouth nightly as needed       FLUoxetine HCl (PROZAC PO) Take 10 mg by mouth daily        fluticasone (FLONASE) 50 MCG/ACT nasal spray 2 sprays  "      LORazepam (ATIVAN) 0.5 MG tablet If needed, take 1 tablet 30 minutes prior to CT scan / Take 1 tablet 30 minutes prior to MRI. Can take 2nd tablet immediately before MRI if needed. 3 tablet 0     tamsulosin (FLOMAX) 0.4 MG capsule Take 2 capsules by mouth daily       acyclovir (ZOVIRAX) 5 % ointment Apply 0.5 inches topically 5 times daily. PRN (Patient not taking: Reported on 8/7/2024)       amLODIPine (NORVASC) 5 MG tablet Take 5 mg by mouth daily (Patient not taking: Reported on 8/7/2024)       carbidopa-levodopa (SINEMET)  MG tablet Take 1 tablet by mouth 3 times daily (Patient not taking: Reported on 8/7/2024) 270 tablet 1     No current facility-administered medications for this visit.      Physical Exam:  Vital signs:      BP: 129/69 Pulse: 85     SpO2: 96 %     Height: 177.8 cm (5' 10\") Weight: 63.8 kg (140 lb 9.6 oz)  Estimated body mass index is 20.17 kg/m  as calculated from the following:    Height as of this encounter: 1.778 m (5' 10\").    Weight as of this encounter: 63.8 kg (140 lb 9.6 oz).  He has full strength in his bilateral upper extremities.  Sensation is intact to light touch throughout.  He has full strength in his bilateral lower extremities as well.  No Onel sign present.  Patellar reflexes are 3+ bilaterally.  Results Reviewed:  I personally viewed the images of an MRI of the cervical spine and brain.  The MRI of the brain shows enlarged ventricles compared to expected for his age.  The MRI of the cervical spine shows a C5-6 disc bulge with C5-6 and C6-7 posterior ligamentum hypertrophy causing stenosis at the C5-6 disc level.  No significant cord compression or signal change.  Assessment:  83-year-old male with imbalance.  His symptoms do not sound like vertigo as he does not have any spinning of the room associated with the imbalance.  He denies any upper extremity symptoms consistent with myelopathy and only remains with balance as a possible myelopathy symptoms.  He also " does have increased reflexes in his lower extremities which could be consistent with myelopathy as well.  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  I do believe NPH is most likely the cause right now however cannot rule out myelopathy as a cause either.  Plan:  At this time I would consider doing a more definitive trial of a lumbar drain with PT to evaluate if this does significantly improve the symptoms.  This however does not establish diagnosis of NPH as it only establish whether the treatment of a  shunt would improve the symptoms.  Should the symptoms not improve with a lumbar drain trial we could consider doing a C5-6 ACDF.  Even with this however I cannot guarantee that his symptoms would stop or get better with his balance after an ACDF as I am not certain that myelopathy is the correct diagnosis.  Should he fail NPH testing and want to be considered for a cervical fusion in the future I will have him come back to discuss that with me again.    I spent 70 minutes reviewing the patient's chart, interviewing examining the patient as well as discussing diagnosis and treatment options.    Kennedy Flynn MD      Again, thank you for allowing me to participate in the care of your patient.        Sincerely,        Kennedy Flynn MD

## 2024-09-18 ENCOUNTER — MYC MEDICAL ADVICE (OUTPATIENT)
Dept: NEUROSURGERY | Facility: CLINIC | Age: 84
End: 2024-09-18
Payer: COMMERCIAL

## 2024-09-20 NOTE — CONFIDENTIAL NOTE
"Wife sent another [employer] message with update that Perry fell this morning.     Called patient and wife to triage .     Patient heading in to his bathroom this morning, said he hesitated for a moment while entering, legs felt shaky and fell landing on both knees.      Denies hitting head or LOC  No HA or migraine  Denies numbness,tingling or weakness in arms and legs  Denies balance or coordination changes   No speech concerns  No hearing or visions changes or loss   Denies confusion or memory loss  No fainting or seizure activity  No reports of n/v  Not diabetic     Reports his legs felt \"shaky\" before he fell, post fall no more shaking. Reports bilateral knee pain. Reviewed ctm, ice, otc pain relievers.    He's sitting up and reading in a chair currently.      Reviewed red flag symptoms with patient and wife. Advised to seek emergent care should he develop any. They verbalized understanding.     Enc routed to Dr. Flynn to inform.         "

## 2024-09-20 NOTE — CONFIDENTIAL NOTE
Patient sent myc with additional questions which was routed to Dr. Flynn for review. Dr. Flynn replied back that  Dr. Gill will be talking with the other neurology provider 9/20/24.Updated patient and wife via myc.

## 2024-09-24 NOTE — CONFIDENTIAL NOTE
Wife sent myc checking on status of conversation between neurologists for next steps/POC. Enc routed to Dr. Flynn.

## 2024-09-25 NOTE — TELEPHONE ENCOUNTER
Called to discuss instead of kiranhart    Last lorelei asks about a asking Dr Flynn to conference with a second neurologist for the neck    Called to clarify- she actually means a neurosurgeon for consideration of fusion surgery    Advised that they can ask for a second opinion if they wish    Will wait for communication updates from providers and update wife and pt

## 2024-09-25 NOTE — TELEPHONE ENCOUNTER
Called patient and wife to see if anything else from nsgy is needed and wife denies any more needs from us

## 2024-10-11 ENCOUNTER — DOCUMENTATION ONLY (OUTPATIENT)
Dept: NEUROSURGERY | Facility: CLINIC | Age: 84
End: 2024-10-11
Payer: COMMERCIAL

## 2024-10-11 NOTE — PROGRESS NOTES
Faxed signed paperwork Medicare/MA certification for physical/occupational therapy to fax# [phone] per JOSSELYN GALLOWAY

## 2024-10-12 ENCOUNTER — HEALTH MAINTENANCE LETTER (OUTPATIENT)
Age: 84
End: 2024-10-12

## 2024-11-06 ENCOUNTER — LAB (OUTPATIENT)
Dept: LAB | Facility: CLINIC | Age: 84
End: 2024-11-06
Payer: COMMERCIAL

## 2024-11-06 ENCOUNTER — OFFICE VISIT (OUTPATIENT)
Dept: NEUROLOGY | Facility: CLINIC | Age: 84
End: 2024-11-06
Attending: PSYCHIATRY & NEUROLOGY
Payer: COMMERCIAL

## 2024-11-06 VITALS
BODY MASS INDEX: 20.92 KG/M2 | OXYGEN SATURATION: 96 % | SYSTOLIC BLOOD PRESSURE: 130 MMHG | DIASTOLIC BLOOD PRESSURE: 74 MMHG | WEIGHT: 145.8 LBS | HEART RATE: 87 BPM

## 2024-11-06 DIAGNOSIS — D86.9 SARCOIDOSIS: ICD-10-CM

## 2024-11-06 DIAGNOSIS — Z79.52 LONG-TERM CORTICOSTEROID USE: ICD-10-CM

## 2024-11-06 DIAGNOSIS — L98.9 SKIN LESION: ICD-10-CM

## 2024-11-06 DIAGNOSIS — G95.9 MYELOPATHY (H): ICD-10-CM

## 2024-11-06 DIAGNOSIS — G95.9 MYELOPATHY (H): Primary | ICD-10-CM

## 2024-11-06 LAB
ALBUMIN SERPL BCG-MCNC: 4 G/DL (ref 3.5–5.2)
ALP SERPL-CCNC: 65 U/L (ref 40–150)
ALT SERPL W P-5'-P-CCNC: 18 U/L (ref 0–70)
ANION GAP SERPL CALCULATED.3IONS-SCNC: 10 MMOL/L (ref 7–15)
AST SERPL W P-5'-P-CCNC: 17 U/L (ref 0–45)
BASOPHILS # BLD AUTO: 0 10E3/UL (ref 0–0.2)
BASOPHILS NFR BLD AUTO: 0 %
BILIRUB SERPL-MCNC: 0.7 MG/DL
BUN SERPL-MCNC: 30.2 MG/DL (ref 8–23)
CALCIUM SERPL-MCNC: 8.8 MG/DL (ref 8.8–10.4)
CHLORIDE SERPL-SCNC: 107 MMOL/L (ref 98–107)
CREAT SERPL-MCNC: 1 MG/DL (ref 0.67–1.17)
EGFRCR SERPLBLD CKD-EPI 2021: 76 ML/MIN/1.73M2
EOSINOPHIL # BLD AUTO: 0 10E3/UL (ref 0–0.7)
EOSINOPHIL NFR BLD AUTO: 0 %
ERYTHROCYTE [DISTWIDTH] IN BLOOD BY AUTOMATED COUNT: 15.7 % (ref 10–15)
GLUCOSE SERPL-MCNC: 118 MG/DL (ref 70–99)
HCO3 SERPL-SCNC: 24 MMOL/L (ref 22–29)
HCT VFR BLD AUTO: 45.9 % (ref 40–53)
HGB BLD-MCNC: 14.6 G/DL (ref 13.3–17.7)
IMM GRANULOCYTES # BLD: 0.1 10E3/UL
IMM GRANULOCYTES NFR BLD: 1 %
LYMPHOCYTES # BLD AUTO: 0.8 10E3/UL (ref 0.8–5.3)
LYMPHOCYTES NFR BLD AUTO: 7 %
MCH RBC QN AUTO: 30 PG (ref 26.5–33)
MCHC RBC AUTO-ENTMCNC: 31.8 G/DL (ref 31.5–36.5)
MCV RBC AUTO: 94 FL (ref 78–100)
MONOCYTES # BLD AUTO: 0.3 10E3/UL (ref 0–1.3)
MONOCYTES NFR BLD AUTO: 2 %
NEUTROPHILS # BLD AUTO: 10.5 10E3/UL (ref 1.6–8.3)
NEUTROPHILS NFR BLD AUTO: 89 %
NRBC # BLD AUTO: 0 10E3/UL
NRBC BLD AUTO-RTO: 0 /100
PLATELET # BLD AUTO: 187 10E3/UL (ref 150–450)
POTASSIUM SERPL-SCNC: 4.5 MMOL/L (ref 3.4–5.3)
PROT SERPL-MCNC: 6 G/DL (ref 6.4–8.3)
RBC # BLD AUTO: 4.87 10E6/UL (ref 4.4–5.9)
SODIUM SERPL-SCNC: 141 MMOL/L (ref 135–145)
WBC # BLD AUTO: 11.8 10E3/UL (ref 4–11)

## 2024-11-06 PROCEDURE — G0463 HOSPITAL OUTPT CLINIC VISIT: HCPCS | Performed by: PSYCHIATRY & NEUROLOGY

## 2024-11-06 PROCEDURE — 85025 COMPLETE CBC W/AUTO DIFF WBC: CPT | Performed by: PATHOLOGY

## 2024-11-06 PROCEDURE — G2211 COMPLEX E/M VISIT ADD ON: HCPCS | Performed by: PSYCHIATRY & NEUROLOGY

## 2024-11-06 PROCEDURE — 80053 COMPREHEN METABOLIC PANEL: CPT | Performed by: PATHOLOGY

## 2024-11-06 PROCEDURE — 99215 OFFICE O/P EST HI 40 MIN: CPT | Performed by: PSYCHIATRY & NEUROLOGY

## 2024-11-06 PROCEDURE — 36415 COLL VENOUS BLD VENIPUNCTURE: CPT | Performed by: PATHOLOGY

## 2024-11-06 RX ORDER — MYCOPHENOLATE MOFETIL 500 MG/1
TABLET ORAL
Qty: 120 TABLET | Refills: 11 | Status: SHIPPED | OUTPATIENT
Start: 2024-11-06 | End: 2025-11-08

## 2024-11-06 RX ORDER — SULFAMETHOXAZOLE AND TRIMETHOPRIM 400; 80 MG/1; MG/1
1 TABLET ORAL DAILY
Qty: 90 TABLET | Refills: 1 | Status: SHIPPED | OUTPATIENT
Start: 2024-11-06

## 2024-11-06 ASSESSMENT — PAIN SCALES - GENERAL: PAINLEVEL_OUTOF10: NO PAIN (0)

## 2024-11-06 NOTE — NURSING NOTE
Chief Complaint   Patient presents with    MS    RECHECK     MS follow up      Vitals were taken and medications were reconciled.   Jonny Thomas, EMT  12:57 PM

## 2024-11-06 NOTE — PATIENT INSTRUCTIONS
Continue prednisone 60 mg daily until your next visit with me     Start cellcept   500 mg twice per day for 1 week   Then take 1000 mg twice per day   Notify me if you struggle with side effects (stomach symptoms or headache)     Blood work today     Visit with dermatology     I have recommended you visit with [facility] at [facility]   Radha Castelantonka  [address]  [place] [address]  Phone: [phone]  Fax: [phone]  Email: [email or web address]    Follow up in about 10 weeks

## 2024-11-06 NOTE — PROGRESS NOTES
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     After his last visit he was seen by neurosurgery.  They did not think that symptoms were due to cervical spondylosis.  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  He started on prednisone 60 mg daily.  He did notice improvement in symptoms with prednisone.  He estimates a 3 out of 10 improvement.  His balance, posture seemed more stable.  He was able to walk around the home without grabbing onto something.  However I did order for him to start tapering the medication after 1 month.  As he started to reduce the dose he started to notice a return of symptoms.  However, this also occurred around the time he had a fall where he slipped off his bed and hit his head on a nightstand.    He notes that he has some difficulty focusing his vision over the course of the past couple weeks.    There has been no change in tremor during the course of his treatment.    He notices a decline in urgency of bladder.  There is also slight improvement in constipation.    He is sleeping okay and does not notice any substantial adverse effects of prednisone.    Treatment hx:   Prednisone 10/1/2024-present    Allergies   Allergen Reactions    Wellbutrin [Bupropion Hcl] Anxiety       Current Outpatient Medications   Medication Sig Dispense Refill    acetaminophen 650 MG TABS Take 650 mg by mouth every 6 hours as needed for mild pain 100 tablet     aspirin (ASA) 81 MG chewable tablet Take 81 mg by mouth      atorvastatin (LIPITOR) 40 MG tablet Take 40 mg by mouth every evening      diazepam (VALIUM) 5 MG tablet Take 1-2 tablets 30 minutes before MRI, 3rd tablet if needed. No driving for 8 hours after taking. 3 tablet 0    diphenhydrAMINE-acetaminophen (TYLENOL PM EXTRA STRENGTH)   MG tablet Take 1 tablet by mouth nightly as needed      FLUoxetine HCl (PROZAC PO) Take 10 mg by mouth daily       fluticasone (FLONASE) 50 MCG/ACT nasal spray 2 sprays      LORazepam (ATIVAN) 0.5 MG tablet If needed, take 1 tablet 30 minutes prior to CT scan / Take 1 tablet 30 minutes prior to MRI. Can take 2nd tablet immediately before MRI if needed. 3 tablet 0    predniSONE (DELTASONE) 20 MG tablet Take 3 tablets (60 mg) by mouth daily. 90 tablet 0    predniSONE (DELTASONE) 20 MG tablet Take 3 tablets (60 mg) by mouth daily for 7 days, THEN 2.5 tablets (50 mg) daily for 7 days, THEN 2 tablets (40 mg) daily for 28 days. 95 tablet 0    sulfamethoxazole-trimethoprim (BACTRIM) 400-80 MG tablet Take 1 tablet by mouth daily. 30 tablet 1    tamsulosin (FLOMAX) 0.4 MG capsule Take 2 capsules by mouth daily      acyclovir (ZOVIRAX) 5 % ointment Apply 0.5 inches topically 5 times daily. PRN (Patient not taking: Reported on 11/6/2024)      amLODIPine (NORVASC) 5 MG tablet Take 5 mg by mouth daily (Patient not taking: Reported on 8/7/2024)      carbidopa-levodopa (SINEMET)  MG tablet Take 1 tablet by mouth 3 times daily (Patient not taking: Reported on 11/6/2024) 270 tablet 1     No current facility-administered medications for this visit.        Past medical, surgical, social and family history was personally reviewed. Pertinent details noted above.     Physical Examination:   /74 (BP Location: Left arm, Patient Position: Sitting, Cuff Size: Adult Regular)   Pulse 87   Wt 66.1 kg (145 lb 12.8 oz)   SpO2 96%   BMI 20.92 kg/m      General: no acute distress  Cranial nerves:   VFFC  PERRL w/no RAPD  EOM full w/no NONI smooth pursuit choppy  Face symmetric  Hearing intact  No dysarthria   Motor:   Tone is increased some paratonia   Bulk is normal for age  Resting < postural tremor                          RL  Deltoid             5          5  Biceps             5          5  Triceps  5 5  Wrist  ext 5 5  Finger ext 5 5  Finger abd 5 5     Hip flexion       4+ 4+  Knee flexion 5 5  Knee ext 5 5  Ankle d/f 5 5     Reflexes:preserved in lower extremities and brisk in right leg w 4+ in both achilles, babinski present bilaterally  Sensory: vibration is moderately reduced in the toes, JPS mildly reduced in the R toe   Romberg is present  Coordination:mild/mod ataxia of lower extremities  Gait: wide based gait with slight swing of right leg     Tests/Imaging:   CSF 65 wbc, lymphocytic   Pro 77  Glu 38  Flow cytometry polytypic b cells  Cytology - mixed acute and chronic inflammatory cells, neg malignancy  12 ocb  Elevated igg index     [place] autoimmune panel neg from serum      Ct c/a/p neg for malignancy or sarcoidosis     MRI Brain  7/2024-notable for non specific deep white matter hyperintensities rather confluent, atrophy noted w prominence of ventricles, no abnormal gadolinium enhancement including no evidence of leptomeningeal enhancement, no significant change when compared to 1/2024    MRI Cervical spine   12/2023-stenosis c5-6, no abnormal gd enhancement   8/2024 - stenosis most notable at c5-6, no evidence of myelitis     MRI Thoracic spine   12/2023 - normal appearing thoracic spinal cord, no abnormal gd enhancement   8/2024 - no myelitis    Assessment: 83-year-old man who has experienced a chronic progressive decline in gait.  This is in the setting of an inflammatory CSF profile.  Symptoms are suspected due to sarcoidosis, though we did not have biopsy confirmation.    He does report the presence of a skin lesion on the left shin.  Referral to dermatology for biopsy was placed.  If sarcoidosis is confirmed, I would feel more comfortable recommending a treatment such as Remicade.    Regardless, he did get benefit from his initial course of steroids.  I have therefore recommended start CellCept as a steroids sparing agent.  We discussed risks associated with long-term use of prednisone.  He is advised to  continue prednisone at the current dose for the next 3 months while CellCept starts to take effect.    Plan:   -Start CellCept  - Continue prednisone  - Derm referral  - PT referral  - Follow-up in 10 weeks    Note was completed with the assistance of Dragon Fluency software which can often result in accidental word substitutions.   The longitudinal plan of care for the diagnosis(es)/condition(s) as documented were addressed during this visit. Due to the added complexity in care, I will continue to support Perry in the subsequent management and with ongoing continuity of care.    A total of 40 minutes on the date of service were spent in the care of this patient.   Jessie Shanks MD on 11/6/2024 at 1:04 PM

## 2024-11-06 NOTE — LETTER
11/6/2024       RE: Perry Soni  [address] [place] [address]     Dear Colleague,    Thank you for referring your patient, Perry Soni, to the [facility] [place] at [facility]. Please see a copy of my visit note below.    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     After his last visit he was seen by neurosurgery.  They did not think that symptoms were due to cervical spondylosis.  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  He started on prednisone 60 mg daily.  He did notice improvement in symptoms with prednisone.  He estimates a 3 out of 10 improvement.  His balance, posture seemed more stable.  He was able to walk around the home without grabbing onto something.  However I did order for him to start tapering the medication after 1 month.  As he started to reduce the dose he started to notice a return of symptoms.  However, this also occurred around the time he had a fall where he slipped off his bed and hit his head on a nightstand.    He notes that he has some difficulty focusing his vision over the course of the past couple weeks.    There has been no change in tremor during the course of his treatment.    He notices a decline in urgency of bladder.  There is also slight improvement in constipation.    He is sleeping okay and does not notice any substantial adverse effects of prednisone.    Treatment hx:   Prednisone 10/1/2024-present    Allergies   Allergen Reactions     Wellbutrin [Bupropion Hcl] Anxiety       Current Outpatient Medications   Medication Sig Dispense Refill     acetaminophen 650 MG TABS Take 650 mg by mouth every 6 hours as needed for mild pain 100 tablet      aspirin (ASA)  81 MG chewable tablet Take 81 mg by mouth       atorvastatin (LIPITOR) 40 MG tablet Take 40 mg by mouth every evening       diazepam (VALIUM) 5 MG tablet Take 1-2 tablets 30 minutes before MRI, 3rd tablet if needed. No driving for 8 hours after taking. 3 tablet 0     diphenhydrAMINE-acetaminophen (TYLENOL PM EXTRA STRENGTH)  MG tablet Take 1 tablet by mouth nightly as needed       FLUoxetine HCl (PROZAC PO) Take 10 mg by mouth daily        fluticasone (FLONASE) 50 MCG/ACT nasal spray 2 sprays       LORazepam (ATIVAN) 0.5 MG tablet If needed, take 1 tablet 30 minutes prior to CT scan / Take 1 tablet 30 minutes prior to MRI. Can take 2nd tablet immediately before MRI if needed. 3 tablet 0     predniSONE (DELTASONE) 20 MG tablet Take 3 tablets (60 mg) by mouth daily. 90 tablet 0     predniSONE (DELTASONE) 20 MG tablet Take 3 tablets (60 mg) by mouth daily for 7 days, THEN 2.5 tablets (50 mg) daily for 7 days, THEN 2 tablets (40 mg) daily for 28 days. 95 tablet 0     sulfamethoxazole-trimethoprim (BACTRIM) 400-80 MG tablet Take 1 tablet by mouth daily. 30 tablet 1     tamsulosin (FLOMAX) 0.4 MG capsule Take 2 capsules by mouth daily       acyclovir (ZOVIRAX) 5 % ointment Apply 0.5 inches topically 5 times daily. PRN (Patient not taking: Reported on 11/6/2024)       amLODIPine (NORVASC) 5 MG tablet Take 5 mg by mouth daily (Patient not taking: Reported on 8/7/2024)       carbidopa-levodopa (SINEMET)  MG tablet Take 1 tablet by mouth 3 times daily (Patient not taking: Reported on 11/6/2024) 270 tablet 1     No current facility-administered medications for this visit.        Past medical, surgical, social and family history was personally reviewed. Pertinent details noted above.     Physical Examination:   /74 (BP Location: Left arm, Patient Position: Sitting, Cuff Size: Adult Regular)   Pulse 87   Wt 66.1 kg (145 lb 12.8 oz)   SpO2 96%   BMI 20.92 kg/m      General: no acute distress  Cranial nerves:    VFFC  PERRL w/no RAPD  EOM full w/no NONI smooth pursuit choppy  Face symmetric  Hearing intact  No dysarthria   Motor:   Tone is increased some paratonia   Bulk is normal for age  Resting < postural tremor                          RL  Deltoid             5          5  Biceps             5          5  Triceps  5 5  Wrist ext 5 5  Finger ext 5 5  Finger abd 5 5     Hip flexion       4+ 4+  Knee flexion 5 5  Knee ext 5 5  Ankle d/f 5 5     Reflexes:preserved in lower extremities and brisk in right leg w 4+ in both achilles, babinski present bilaterally  Sensory: vibration is moderately reduced in the toes, JPS mildly reduced in the R toe   Romberg is present  Coordination:mild/mod ataxia of lower extremities  Gait: wide based gait with slight swing of right leg     Tests/Imaging:   CSF 65 wbc, lymphocytic   Pro 77  Glu 38  Flow cytometry polytypic b cells  Cytology - mixed acute and chronic inflammatory cells, neg malignancy  12 ocb  Elevated igg index     [place] autoimmune panel neg from serum      Ct c/a/p neg for malignancy or sarcoidosis     MRI Brain  7/2024-notable for non specific deep white matter hyperintensities rather confluent, atrophy noted w prominence of ventricles, no abnormal gadolinium enhancement including no evidence of leptomeningeal enhancement, no significant change when compared to 1/2024    MRI Cervical spine   12/2023-stenosis c5-6, no abnormal gd enhancement   8/2024 - stenosis most notable at c5-6, no evidence of myelitis     MRI Thoracic spine   12/2023 - normal appearing thoracic spinal cord, no abnormal gd enhancement   8/2024 - no myelitis    Assessment: 83-year-old man who has experienced a chronic progressive decline in gait.  This is in the setting of an inflammatory CSF profile.  Symptoms are suspected due to sarcoidosis, though we did not have biopsy confirmation.    He does report the presence of a skin lesion on the left shin.  Referral to dermatology for biopsy was placed.  If  sarcoidosis is confirmed, I would feel more comfortable recommending a treatment such as Remicade.    Regardless, he did get benefit from his initial course of steroids.  I have therefore recommended start CellCept as a steroids sparing agent.  We discussed risks associated with long-term use of prednisone.  He is advised to continue prednisone at the current dose for the next 3 months while CellCept starts to take effect.    Plan:   -Start CellCept  - Continue prednisone  - Derm referral  - PT referral  - Follow-up in 10 weeks    Note was completed with the assistance of Dragon Fluency software which can often result in accidental word substitutions.   The longitudinal plan of care for the diagnosis(es)/condition(s) as documented were addressed during this visit. Due to the added complexity in care, I will continue to support Perry in the subsequent management and with ongoing continuity of care.    A total of 40 minutes on the date of service were spent in the care of this patient.   Jessie Shanks MD on 11/6/2024 at 1:04 PM          Again, thank you for allowing me to participate in the care of your patient.      Sincerely,    Jessie Shanks MD

## 2024-11-07 ENCOUNTER — TELEPHONE (OUTPATIENT)
Dept: DERMATOLOGY | Facility: CLINIC | Age: 84
End: 2024-11-07
Payer: COMMERCIAL

## 2024-11-07 NOTE — TELEPHONE ENCOUNTER
Patient Contact    Attempt # 1    Was call answered?  No    Left message on answering machine for patient to call back.    Kate MCRAE RN  Dermatology   [phone]

## 2024-11-07 NOTE — TELEPHONE ENCOUNTER
This encounter is being sent to inform the clinic that this patient has a referral from Jessie Shanks MD in  [place] for the diagnoses of Sarcoidosis [D86.9] Skin lesion [L98.9] Myelopathy (H) [G95.9] and has requested that this patient be seen within 3-5 days.  Based on the availability of our provider(s), we are unable to accommodate this request.    Were all sites offered this patient?  Yes    Does scheduling algorithm request to schedule next available?  Patient has been scheduled for the first available opening with Arias Leonardo MD on 11/27/24.  We have informed the patient that the clinic will review their referral and reach out if a sooner appointment is medically necessary.

## 2024-11-08 NOTE — TELEPHONE ENCOUNTER
Pt called back, appt scheduled is most appropriate and pt will keep that appt    Kate MCRAE RN  Dermatology   [phone]

## 2024-11-14 ENCOUNTER — TELEPHONE (OUTPATIENT)
Dept: DERMATOLOGY | Facility: CLINIC | Age: 84
End: 2024-11-14
Payer: COMMERCIAL

## 2024-11-14 NOTE — TELEPHONE ENCOUNTER
Patient Contact    Attempt # 1    Was call answered?  No    Left message on answering machine for patient to call back.    Kate MCRAE RN  Dermatology   [phone]

## 2024-11-14 NOTE — TELEPHONE ENCOUNTER
[facility] Call Center    Phone Message    May a detailed message be left on voicemail: yes     Reason for Call: Other: Please call pt to discuss if a biopsy is still necessary. The lesion is healing and drying up/. Will there be enough spot to biopsy?      Action Taken: TE SENT    Travel Screening: Not Applicable     Date of Service:                   Thank you!  [facility]

## 2024-11-18 NOTE — TELEPHONE ENCOUNTER
Patient Contact    Attempt # 2    Was call answered?  No    Left message on answering machine for patient to call back.    Kate MCRAE RN  Dermatology   [phone]

## 2024-11-18 NOTE — TELEPHONE ENCOUNTER
Pt called back. Scheduled sooner appt for biopsy    Thank you,  Kate MCRAE RN  Dermatology   [phone]

## 2024-11-20 ENCOUNTER — OFFICE VISIT (OUTPATIENT)
Dept: DERMATOLOGY | Facility: CLINIC | Age: 84
End: 2024-11-20
Attending: PSYCHIATRY & NEUROLOGY
Payer: COMMERCIAL

## 2024-11-20 DIAGNOSIS — D48.9 NEOPLASM OF UNCERTAIN BEHAVIOR: ICD-10-CM

## 2024-11-20 NOTE — PATIENT INSTRUCTIONS
Wound Care After a Biopsy    What is a skin biopsy?  A skin biopsy allows the doctor to examine a very small piece of tissue under the microscope to determine the diagnosis and the best treatment for the skin condition. A local anesthetic (numbing medicine)  is injected with a very small needle into the skin area to be tested. A small piece of skin is taken from the area. Sometimes a suture (stitch) is used.     What are the risks of a skin biopsy?  I will experience scar, bleeding, swelling, pain, crusting and redness. I may experience incomplete removal or recurrence. Risks of this procedure are excessive bleeding, bruising, infection, nerve damage, numbness, thick (hypertrophic or keloidal) scar and non-diagnostic biopsy.    How should I care for my wound for the first 24 hours?  Keep the wound dry and covered for 24 hours  If it bleeds, hold direct pressure on the area for 15 minutes. If bleeding does not stop then go to the emergency room  Avoid strenuous exercise the first 1-2 days or as your doctor instructs you    How should I care for the wound after 24 hours?  After 24 hours, remove the bandage  You may bathe or shower as normal  If you had a scalp biopsy, you can shampoo as usual and can use shower water to clean the biopsy site daily  Clean the wound twice a day with gentle soap and water  Do not scrub, be gentle  Apply white petroleum/Vaseline after cleaning the wound with a cotton swab or a clean finger, and keep the site covered with a Bandaid /bandage. Bandages are not necessary with a scalp biopsy  If you are unable to cover the site with a Bandaid /bandage, re-apply ointment 2-3 times a day to keep the site moist. Moisture will help with healing  Avoid strenuous activity for first 1-2 days  Avoid lakes, rivers, pools, and oceans until the stitches are removed or the site is healed    How do I clean my wound?  Wash hands thoroughly with soap or use hand  before all wound care  Clean the  wound with gentle soap and water  Apply white petroleum/Vaseline  to wound after it is clean  Replace the Bandaid /bandage to keep the wound covered for the first few days or as instructed by your doctor  If you had a scalp biopsy, warm shower water to the area on a daily basis should suffice    What should I use to clean my wound?   Cotton-tipped applicators (Qtips )  White petroleum jelly (Vaseline ). Use a clean new container and use Q-tips to apply.  Bandaids   as needed  Gentle soap     How should I care for my wound long term?  Do not get your wound dirty  Keep up with wound care for one week or until the area is healed.  A small scab will form and fall off by itself when the area is completely healed. The area will be red and will become pink in color as it heals. Sun protection is very important for how your scar will turn out. Sunscreen with an SPF 30 or greater is recommended once the area is healed.  If you have stitches, stitches need to be removed in 7 days on the face/neck, or 10-14 days on the body days. You may return to our clinic for this or you may have it done locally at your doctor s office.  You should have some soreness but it should be mild and slowly go away over several days. Talk to your doctor about using tylenol for pain,    When should I call my doctor?  If you have increased:   Pain or swelling  Pus or drainage (clear or slightly yellow drainage is ok)  Temperature over 100F  Spreading redness or warmth around wound    When will I hear about my results?  The biopsy results can take 2 weeks to come back.  Your results will automatically release to [employer] before your provider has even reviewed them.  The clinic will call you with the results, send you a [employer] message, or have you schedule a follow-up clinic or phone time to discuss the results.  Contact our clinics if you do not hear from us in 2 weeks. If further treatment is needed for a skin cancer, this will be done at either our  [place] or [place] office.    Who should I call with questions?  [facility]: [phone]  [facility]: [phone]  For urgent needs outside of business hours call the [facility] at [phone] and ask for the dermatology resident on call

## 2024-11-20 NOTE — LETTER
11/20/2024      Perry Soni  [address] [place] [address]      Dear Colleague,    Thank you for referring your patient, Perry Soni, to the [facility]. Please see a copy of my visit note below.    [facility] Dermatology Note  Encounter Date: Nov 20, 2024  Office Visit     Reviewed patients past medical history and pertinent chart review prior to patients visit today.     Dermatology Problem List:  # Neoplasm of uncertain behavior: left shin, s/p punch biopsy 11/20/2024     Personal Hx: Negative for personal history of skin cancer.   Family Hx: Negative for family history of skin cancer.   ___________________________________________    Assessment & Plan:     # Neoplasm of uncertain behavior:  left shin. Patient was sent to dermatology for a biopsy of this lesion to rule out potential sarcoidosis. Differential diagnosis also includes nummular eczema which I favor is the most likely diagnosis based on history and physical exam vs psoriasis vs other. Punch biopsy today.  Photograph obtained.  Procedure Note: Biopsy by punch technique  After discussion of benefits and risks including but not limited to bleeding/bruising, pain/swelling, infection, scar, incomplete removal, nerve damage/numbness, recurrence, and non-diagnostic biopsy. verbal consent and photographs were obtained. Time-out was performed. The area was cleaned with isopropyl alcohol. 0.5mL of 1% lidocaine with epinephrine was injected to obtain adequate anesthesia of the lesion. A 4 mm punch biopsy was performed. 4-0 Ethilon sutures were utilized to approximate the epidermal edges.  Sutures should be removed in 10-14 days. A suture removal kit and education on suture removal provided.  White petroleum jelly/VaselineTM and a bandage was applied to the wound.  Patient to continue with Vaseline to biopsy site once daily until sutures dissolved.  Explicit verbal and written wound care  instructions were provided.  The patient left the Dermatology Clinic in good condition. The patient was counseled that sutures should dissolve on their own.      Follow up: pending biopsy results.     All risks, benefits and alternatives were discussed with patient.  Patient is in agreement and understands the assessment and plan.  All questions were answered.  Sari Angel PA-C  [facility]  _______________________________________    CC: Derm Problem (Sacroidosis)     HPI:  Mr. Perry Soni is a(n) 83 year old male who presents today as a new patient for evaluation of a skin lesion involving the left shin. This lesion has been present for a few month. It does sometimes itch. It is not tender or painful. He has not tried any treatments for this lesion. He feels this has been improving on it's own. It has been decreasing in size. He has no other lesions similar to this.     Patient has a 1 year history of gait instability/imbalance. This is in the setting of an inflammatory CSF profile. He has been following with neurology who suspects his symptoms could be secondary to sarcoidosis. He was referred to dermatology today for a biopsy of the lesion on the left shin for possible confirmation of sarcoidosis.    The patient denies weight loss, fevers, chills, abdominal pain, nausea/vomiting, diarrhea, shortness of breath and chest pain.    He reports having constipation, worsening vision, and a cough for the past couple of months. He reports he was recently evaluated by ophthalmology and no worrisome findings were identified. He also has had a CT of the chest/abdomen/pelvis which revealed no evidence of malignancy or sarcoidosis.    Patient does not have any other additional skin concerns.    Physical Exam:  SKIN: Focused examination of bilateral shins only was performed per patient request.  - Involving the left shin is a 2 cm x 1.5 cm scaly pink plaque.    - No other lesions of concern on areas examined.          Medications:  Current Outpatient Medications   Medication Sig Dispense Refill     acetaminophen 650 MG TABS Take 650 mg by mouth every 6 hours as needed for mild pain 100 tablet      acyclovir (ZOVIRAX) 5 % ointment Apply topically 5 times daily. PRN       aspirin (ASA) 81 MG chewable tablet Take 81 mg by mouth       atorvastatin (LIPITOR) 40 MG tablet Take 40 mg by mouth every evening       carbidopa-levodopa (SINEMET)  MG tablet Take 1 tablet by mouth 3 times daily 270 tablet 1     diazepam (VALIUM) 5 MG tablet Take 1-2 tablets 30 minutes before MRI, 3rd tablet if needed. No driving for 8 hours after taking. 3 tablet 0     diphenhydrAMINE-acetaminophen (TYLENOL PM EXTRA STRENGTH)  MG tablet Take 1 tablet by mouth nightly as needed       FLUoxetine HCl (PROZAC PO) Take 10 mg by mouth daily        fluticasone (FLONASE) 50 MCG/ACT nasal spray 2 sprays       LORazepam (ATIVAN) 0.5 MG tablet If needed, take 1 tablet 30 minutes prior to CT scan / Take 1 tablet 30 minutes prior to MRI. Can take 2nd tablet immediately before MRI if needed. 3 tablet 0     mycophenolate (GENERIC EQUIVALENT) 500 MG tablet Take 1 tablet (500 mg) by mouth 2 times daily for 7 days, THEN 2 tablets (1,000 mg) 2 times daily. 120 tablet 11     predniSONE (DELTASONE) 20 MG tablet Take 3 tablets (60 mg) by mouth daily. 90 tablet 0     sulfamethoxazole-trimethoprim (BACTRIM) 400-80 MG tablet Take 1 tablet by mouth daily. 90 tablet 1     tamsulosin (FLOMAX) 0.4 MG capsule Take 2 capsules by mouth daily       amLODIPine (NORVASC) 5 MG tablet Take 5 mg by mouth daily (Patient not taking: Reported on 8/7/2024)       No current facility-administered medications for this visit.      Past Medical History:   Patient Active Problem List   Diagnosis     Cerebrovascular dural AV fistula     Dural arteriovenous fistula     Past Medical History:   Diagnosis Date     AVM (arteriovenous malformation)      Depression      Hyperlipidemia        CC  Jessie Shanks MD  [address] [place] [address] on close of this encounter.       Again, thank you for allowing me to participate in the care of your patient.        Sincerely,        Odette Angel PA-C

## 2024-11-20 NOTE — PROGRESS NOTES
[facility] Dermatology Note  Encounter Date: Nov 20, 2024  Office Visit     Reviewed patients past medical history and pertinent chart review prior to patients visit today.     Dermatology Problem List:  # Neoplasm of uncertain behavior: left shin, s/p punch biopsy 11/20/2024     Personal Hx: Negative for personal history of skin cancer.   Family Hx: Negative for family history of skin cancer.   ___________________________________________    Assessment & Plan:     # Neoplasm of uncertain behavior:  left shin. Patient was sent to dermatology for a biopsy of this lesion to rule out potential sarcoidosis. Differential diagnosis also includes nummular eczema which I favor is the most likely diagnosis based on history and physical exam vs psoriasis vs other. Punch biopsy today.  Photograph obtained.  Procedure Note: Biopsy by punch technique  After discussion of benefits and risks including but not limited to bleeding/bruising, pain/swelling, infection, scar, incomplete removal, nerve damage/numbness, recurrence, and non-diagnostic biopsy. verbal consent and photographs were obtained. Time-out was performed. The area was cleaned with isopropyl alcohol. 0.5mL of 1% lidocaine with epinephrine was injected to obtain adequate anesthesia of the lesion. A 4 mm punch biopsy was performed. 4-0 Ethilon sutures were utilized to approximate the epidermal edges.  Sutures should be removed in 10-14 days. A suture removal kit and education on suture removal provided.  White petroleum jelly/VaselineTM and a bandage was applied to the wound.  Patient to continue with Vaseline to biopsy site once daily until sutures dissolved.  Explicit verbal and written wound care instructions were provided.  The patient left the Dermatology Clinic in good condition. The patient was counseled that sutures should dissolve on their own.      Follow up: pending biopsy results.     All risks, benefits and alternatives were discussed  with patient.  Patient is in agreement and understands the assessment and plan.  All questions were answered.  Sari Angel PA-C  [facility] Dermatology  _______________________________________    CC: Derm Problem (Sacroidosis)     HPI:  Mr. Perry Soni is a(n) 83 year old male who presents today as a new patient for evaluation of a skin lesion involving the left shin. This lesion has been present for a few month. It does sometimes itch. It is not tender or painful. He has not tried any treatments for this lesion. He feels this has been improving on it's own. It has been decreasing in size. He has no other lesions similar to this.     Patient has a 1 year history of gait instability/imbalance. This is in the setting of an inflammatory CSF profile. He has been following with neurology who suspects his symptoms could be secondary to sarcoidosis. He was referred to dermatology today for a biopsy of the lesion on the left shin for possible confirmation of sarcoidosis.    The patient denies weight loss, fevers, chills, abdominal pain, nausea/vomiting, diarrhea, shortness of breath and chest pain.    He reports having constipation, worsening vision, and a cough for the past couple of months. He reports he was recently evaluated by ophthalmology and no worrisome findings were identified. He also has had a CT of the chest/abdomen/pelvis which revealed no evidence of malignancy or sarcoidosis.    Patient does not have any other additional skin concerns.    Physical Exam:  SKIN: Focused examination of bilateral shins only was performed per patient request.  - Involving the left shin is a 2 cm x 1.5 cm scaly pink plaque.    - No other lesions of concern on areas examined.         Medications:  Current Outpatient Medications   Medication Sig Dispense Refill    acetaminophen 650 MG TABS Take 650 mg by mouth every 6 hours as needed for mild pain 100 tablet     acyclovir (ZOVIRAX) 5 % ointment Apply topically 5 times daily.  PRN      aspirin (ASA) 81 MG chewable tablet Take 81 mg by mouth      atorvastatin (LIPITOR) 40 MG tablet Take 40 mg by mouth every evening      carbidopa-levodopa (SINEMET)  MG tablet Take 1 tablet by mouth 3 times daily 270 tablet 1    diazepam (VALIUM) 5 MG tablet Take 1-2 tablets 30 minutes before MRI, 3rd tablet if needed. No driving for 8 hours after taking. 3 tablet 0    diphenhydrAMINE-acetaminophen (TYLENOL PM EXTRA STRENGTH)  MG tablet Take 1 tablet by mouth nightly as needed      FLUoxetine HCl (PROZAC PO) Take 10 mg by mouth daily       fluticasone (FLONASE) 50 MCG/ACT nasal spray 2 sprays      LORazepam (ATIVAN) 0.5 MG tablet If needed, take 1 tablet 30 minutes prior to CT scan / Take 1 tablet 30 minutes prior to MRI. Can take 2nd tablet immediately before MRI if needed. 3 tablet 0    mycophenolate (GENERIC EQUIVALENT) 500 MG tablet Take 1 tablet (500 mg) by mouth 2 times daily for 7 days, THEN 2 tablets (1,000 mg) 2 times daily. 120 tablet 11    predniSONE (DELTASONE) 20 MG tablet Take 3 tablets (60 mg) by mouth daily. 90 tablet 0    sulfamethoxazole-trimethoprim (BACTRIM) 400-80 MG tablet Take 1 tablet by mouth daily. 90 tablet 1    tamsulosin (FLOMAX) 0.4 MG capsule Take 2 capsules by mouth daily      amLODIPine (NORVASC) 5 MG tablet Take 5 mg by mouth daily (Patient not taking: Reported on 8/7/2024)       No current facility-administered medications for this visit.      Past Medical History:   Patient Active Problem List   Diagnosis    Cerebrovascular dural AV fistula    Dural arteriovenous fistula     Past Medical History:   Diagnosis Date    AVM (arteriovenous malformation)     Depression     Hyperlipidemia        CC Jessie Queta Shanks MD  [address] [place] [address] on close of this encounter.

## 2024-11-21 ENCOUNTER — TRANSFERRED RECORDS (OUTPATIENT)
Dept: HEALTH INFORMATION MANAGEMENT | Facility: CLINIC | Age: 84
End: 2024-11-21
Payer: COMMERCIAL

## 2024-11-24 ENCOUNTER — MYC MEDICAL ADVICE (OUTPATIENT)
Dept: NEUROLOGY | Facility: CLINIC | Age: 84
End: 2024-11-24
Payer: COMMERCIAL

## 2024-11-24 DIAGNOSIS — G20.C PARKINSONISM, UNSPECIFIED PARKINSONISM TYPE (H): Primary | ICD-10-CM

## 2024-11-25 DIAGNOSIS — B35.4 TINEA CORPORIS: Primary | ICD-10-CM

## 2024-11-25 LAB
PATH REPORT.COMMENTS IMP SPEC: NORMAL
PATH REPORT.COMMENTS IMP SPEC: NORMAL
PATH REPORT.FINAL DX SPEC: NORMAL
PATH REPORT.GROSS SPEC: NORMAL
PATH REPORT.MICROSCOPIC SPEC OTHER STN: NORMAL
PATH REPORT.RELEVANT HX SPEC: NORMAL

## 2024-11-25 RX ORDER — CLOTRIMAZOLE 1 %
CREAM (GRAM) TOPICAL 2 TIMES DAILY
Qty: 30 G | Refills: 1 | Status: SHIPPED | OUTPATIENT
Start: 2024-11-25

## 2024-11-26 ENCOUNTER — DOCUMENTATION ONLY (OUTPATIENT)
Dept: NEUROLOGY | Facility: CLINIC | Age: 84
End: 2024-11-26
Payer: COMMERCIAL

## 2024-11-26 NOTE — PROGRESS NOTES
Initial evaluation form physical therapy has been received from [facility], form placed in Dr. Shanks's folder for review and signature.   Jonny Thomas EMT November 26, 2024

## 2024-11-27 RX ORDER — CARBIDOPA AND LEVODOPA 25; 100 MG/1; MG/1
1 TABLET ORAL 3 TIMES DAILY
Qty: 90 TABLET | Refills: 1 | Status: SHIPPED | OUTPATIENT
Start: 2024-11-27

## 2024-11-27 NOTE — PROGRESS NOTES
Orders for referral for physical therapy has been received, referral has been faxed over to [facility] at [phone].  Jonny Thomas EMT November 27, 2024

## 2024-12-16 ENCOUNTER — OFFICE VISIT (OUTPATIENT)
Dept: NEUROLOGY | Facility: CLINIC | Age: 84
End: 2024-12-16
Payer: COMMERCIAL

## 2024-12-16 VITALS
BODY MASS INDEX: 20.48 KG/M2 | SYSTOLIC BLOOD PRESSURE: 135 MMHG | DIASTOLIC BLOOD PRESSURE: 79 MMHG | WEIGHT: 142.7 LBS | HEART RATE: 87 BPM

## 2024-12-16 DIAGNOSIS — D86.9 SARCOIDOSIS: ICD-10-CM

## 2024-12-16 DIAGNOSIS — R27.9 UNSPECIFIED LACK OF COORDINATION: ICD-10-CM

## 2024-12-16 DIAGNOSIS — G95.9 MYELOPATHY (H): Primary | ICD-10-CM

## 2024-12-16 DIAGNOSIS — G20.C PARKINSONISM, UNSPECIFIED PARKINSONISM TYPE (H): ICD-10-CM

## 2024-12-16 PROCEDURE — 99215 OFFICE O/P EST HI 40 MIN: CPT | Mod: GC | Performed by: PSYCHIATRY & NEUROLOGY

## 2024-12-16 NOTE — LETTER
2024      Perry Soni  [address] [place] [address]      Dear Colleague,    Thank you for referring your patient, Perry Soni, to the [facility] NEUROLOGY CLINIC [facility]. Please see a copy of my visit note below.    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 Initial course of steroids resulted in only mild improvement followed be return of symptoms during steroid taper. He continues to report progression in his imbalance, weakness, and fatigue. This response is less than would be expected in Sarcoidosis. There is concern his steroid therapy is contributing to his symptoms. Additionally, his primary complaint of instability, rigidity with facilitation on exam, and non-response to Levodopa/Carbidopa therapy also raises concern for an atypical Parkinson Syndrom such as Progressive Supranuclear Palsy (PSP) vs Multi-System Atropthy (MSA) Cerebellar Subtype.    Recommendations:  I think that the steroids are making you a little weaker. I recommend that you start reducing the steroids   Take 50 mg (2.5 tablets) daily for 2 weeks   Then take 40 mg (2 tablets) daily for 2 weeks   Then take 30 mg (1.5 tablets) daily for 2 weeks   Then 20 mg daily for 2 weeks AND call for a new prescription    I encourage you to undergo a MONIK scan to rule out PSP - a parkinson's condition that can affect balance     Continue cellcept     Stop carbidopa levodopa     Follow up  as scheduled    Continue PT as tolerated    Thank you for allowing the neurology service to participate in the care of Perry FERNANDES  "Zachariah.  Please contact us with questions.        This patient was discussed with the neurology attending faculty, Dr. Shanks.    Bhupinder Munguia,   Neurology Resident PGY4  12/16/2024    Last Visit Summary   Patient has a 1 year history of gait instability/imbalance. This is in the setting of an inflammatory CSF profile. He has been following with neurology who suspects his symptoms could be secondary to sarcoidosis. Also has tremor for which he was started on empiric Sinemet for possible Parkinson's.    Plan:   -Start CellCept  - Continue prednisone  - Derm referral  - PT referral  - Follow-up in 10 weeks   Interval History   No improvement since last visit.    *Walking stability/endurance?    Since start of walking, decline in walking ability more due to instability. About 1 year ago, was going to gym 3x per week. Now can only climb 1 flight of stairs before needing to rest.    *Parkinsonsim?    No impact on the tremor. Medication only seems to make him more tired.    Denies any rigidity or bradykinesia. Only affirms postural instability that began around the time his gait difficulties.    *PT effect?    Started 2 weeks ago x4-5 sessions. \"Maybe\" some improvement in walking.     Physical Exam   There were no vitals taken for this visit.      Neurologic  Mental Status:  alert, oriented x 3, follows commands, speech clear and fluent, naming and repetition normal  Cranial Nerves:  visual fields intact, PERRL, EOMI with normal smooth pursuit, facial sensation intact and symmetric, facial movements symmetric, hearing not formally tested but intact to conversation, palate elevation symmetric and uvula midline, no dysarthria, shoulder shrug strong bilaterally, tongue protrusion midline  Motor:  normal muscle tone and bulk, no abnormal movements, able to move all limbs spontaneously, strength 5/5 throughout upper and lower extremities except 4/5 in bilateraly hip flexors, no pronator drift  Reflexes:  2+ in BUE, 3+ in BLE, " no clonus  Sensory:  light touch sensation intact and symmetric throughout upper and lower extremities, no extinction on double simultaneous stimulation   Coordination:  normal finger-to-nose and heel-to-shin bilaterally without dysmetria, rapid alternating movements symmetric  Station/Gait: normal posture, increased width, en block turns, normal arm swing, reduced stride length with mild-moderate instability  -Romberg negative     Investigations   I have personally reviewed most recent and pertinent labs, tests, and radiological images; relevant findings per HPI.    Imaging  MRI Brain w & w/o contrast (07/29/2024 10:59 AM)       Labs   Latest Reference Range & Units 05/01/24 11:36 06/17/24 12:38   RBC CSF 0 - 2 /uL 15 (H) 7 (H)   Total Nucleated Cells 0 - 5 /uL 61 (H) 65 (H)   Appearance CSF Clear  Clear Clear   % Lymphocytes CSF % 86 58   % Mono/Macros CSF % 8 11   % Neutrophils CSF % 6 31   Color CSF Colorless  Colorless Colorless   Glucose CSF 40 - 70 mg/dL 41 38 (LL)   CSF IgG/Albumin Ratio 0.09 - 0.25 ratio  0.30 (H)   CSF Oligoclonal Bands Number 0 - 1 Bands  12 (H)   Oligoclonal Albumin CSF 0 - 35 mg/dL  45 (H)   Oligoclonal IgG CSF 0.0 - 6.0 mg/dL  13.5 (H)   DIFFERENTIAL CSF  Rpt Rpt   Protein total CSF 15.0 - 45.0 mg/dL 61.6 (H) 77.3 (H)   (LL): Data is critically low  (H): Data is abnormally high  Rpt: View report in Results Review for more information      Attestation signed by Jessie Shanks MD at 12/16/2024  4:28 PM:  I personally saw and evaluated Mr. Soni with Dr. Munguia on the date of service.  I have reviewed the above documentation and agree with the findings and recommendations.     Fatigued - taking 2 hour nap midday   Working with PT - helpful   Notes decline in activity tolerance - full flight of stairs makes him fatigued   No falls   Tremor worse - c/l no benefit   Balance very poor - if anything tendency to fall backward     Discussed challenge  CSF highly suggestive of  sarcoidosis, but not cconfirmed on imaging   Clinically he does have a mild myelopathy   However, balance is worse than expected for findings   No significant clinical exam improvement with steroids despite two months use - this would be atypical for sarcoid     I think it is appropriate to look for a secondary source - MONIK scan   PSP or MSA-c     Start tapering prednisone due to concern that it could be causing start of myopathy   Continue cellcept for now   Stop carbidopa/levodopa given no benefit     Follow up as arranged    A total of 40 minutes were personally spent in the care of this patient on the date of service.     Jessie Shanks MD on 12/16/2024 at 4:22 PM      Again, thank you for allowing me to participate in the care of your patient.        Sincerely,        Jessie Shanks MD

## 2024-12-16 NOTE — NURSING NOTE
Chief Complaint   Patient presents with    Follow Up       THANG Ervin on 12/16/2024 at 11:35 AM

## 2024-12-16 NOTE — PROGRESS NOTES
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 Initial course of steroids resulted in only mild improvement followed be return of symptoms during steroid taper. He continues to report progression in his imbalance, weakness, and fatigue. This response is less than would be expected in Sarcoidosis. There is concern his steroid therapy is contributing to his symptoms. Additionally, his primary complaint of instability, rigidity with facilitation on exam, and non-response to Levodopa/Carbidopa therapy also raises concern for an atypical Parkinson Syndrom such as Progressive Supranuclear Palsy (PSP) vs Multi-System Atropthy (MSA) Cerebellar Subtype.    Recommendations:  I think that the steroids are making you a little weaker. I recommend that you start reducing the steroids   Take 50 mg (2.5 tablets) daily for 2 weeks   Then take 40 mg (2 tablets) daily for 2 weeks   Then take 30 mg (1.5 tablets) daily for 2 weeks   Then 20 mg daily for 2 weeks AND call for a new prescription    I encourage you to undergo a MONIK scan to rule out PSP - a parkinson's condition that can affect balance     Continue cellcept     Stop carbidopa levodopa     Follow up 2/ as scheduled    Continue PT as tolerated    Thank you for allowing the neurology service to participate in the care of Perry Soni.  Please contact us with questions.        This patient was discussed with the neurology attending faculty, Dr. Shanks.    Bhupinder Munguia DO  Neurology Resident PGY4  2024    Last Visit Summary   Patient has a 1 year history of gait instability/imbalance.  "This is in the setting of an inflammatory CSF profile. He has been following with neurology who suspects his symptoms could be secondary to sarcoidosis. Also has tremor for which he was started on empiric Sinemet for possible Parkinson's.    Plan:   -Start CellCept  - Continue prednisone  - Derm referral  - PT referral  - Follow-up in 10 weeks   Interval History   No improvement since last visit.    *Walking stability/endurance?    Since start of walking, decline in walking ability more due to instability. About 1 year ago, was going to gym 3x per week. Now can only climb 1 flight of stairs before needing to rest.    *Parkinsonsim?    No impact on the tremor. Medication only seems to make him more tired.    Denies any rigidity or bradykinesia. Only affirms postural instability that began around the time his gait difficulties.    *PT effect?    Started 2 weeks ago x4-5 sessions. \"Maybe\" some improvement in walking.     Physical Exam   There were no vitals taken for this visit.      Neurologic  Mental Status:  alert, oriented x 3, follows commands, speech clear and fluent, naming and repetition normal  Cranial Nerves:  visual fields intact, PERRL, EOMI with normal smooth pursuit, facial sensation intact and symmetric, facial movements symmetric, hearing not formally tested but intact to conversation, palate elevation symmetric and uvula midline, no dysarthria, shoulder shrug strong bilaterally, tongue protrusion midline  Motor:  normal muscle tone and bulk, no abnormal movements, able to move all limbs spontaneously, strength 5/5 throughout upper and lower extremities except 4/5 in bilateraly hip flexors, no pronator drift  Reflexes:  2+ in BUE, 3+ in BLE, no clonus  Sensory:  light touch sensation intact and symmetric throughout upper and lower extremities, no extinction on double simultaneous stimulation   Coordination:  normal finger-to-nose and heel-to-shin bilaterally without dysmetria, rapid alternating " movements symmetric  Station/Gait: normal posture, increased width, en block turns, normal arm swing, reduced stride length with mild-moderate instability  -Romberg negative     Investigations   I have personally reviewed most recent and pertinent labs, tests, and radiological images; relevant findings per HPI.    Imaging  MRI Brain w & w/o contrast (07/29/2024 10:59 AM)       Labs   Latest Reference Range & Units 05/01/24 11:36 06/17/24 12:38   RBC CSF 0 - 2 /uL 15 (H) 7 (H)   Total Nucleated Cells 0 - 5 /uL 61 (H) 65 (H)   Appearance CSF Clear  Clear Clear   % Lymphocytes CSF % 86 58   % Mono/Macros CSF % 8 11   % Neutrophils CSF % 6 31   Color CSF Colorless  Colorless Colorless   Glucose CSF 40 - 70 mg/dL 41 38 (LL)   CSF IgG/Albumin Ratio 0.09 - 0.25 ratio  0.30 (H)   CSF Oligoclonal Bands Number 0 - 1 Bands  12 (H)   Oligoclonal Albumin CSF 0 - 35 mg/dL  45 (H)   Oligoclonal IgG CSF 0.0 - 6.0 mg/dL  13.5 (H)   DIFFERENTIAL CSF  Rpt Rpt   Protein total CSF 15.0 - 45.0 mg/dL 61.6 (H) 77.3 (H)   (LL): Data is critically low  (H): Data is abnormally high  Rpt: View report in Results Review for more information

## 2024-12-16 NOTE — PATIENT INSTRUCTIONS
I think that the steroids are making you a little weaker   I recommend that you start reducing the steroids   Take 50 mg (2.5 tablets) daily for 2 weeks   Then take 40 mg (2 tablets) daily for 2 weeks   Then take 30 mg (1.5 tablets) daily for 2 weeks   Then 20 mg daily for 2 weeks AND call for a new prescription    I encourage you to undergo a MONIK scan to rule out PSP - a parkinson's condition that can affect balance     Continue cellcept     Stop carbidopa levodopa     Follow up 2/5 as scheduled

## 2025-02-05 ENCOUNTER — OFFICE VISIT (OUTPATIENT)
Dept: NEUROLOGY | Facility: CLINIC | Age: 85
End: 2025-02-05
Attending: PSYCHIATRY & NEUROLOGY
Payer: COMMERCIAL

## 2025-02-05 VITALS
DIASTOLIC BLOOD PRESSURE: 67 MMHG | HEART RATE: 96 BPM | WEIGHT: 141.3 LBS | HEIGHT: 70 IN | OXYGEN SATURATION: 97 % | SYSTOLIC BLOOD PRESSURE: 146 MMHG | BODY MASS INDEX: 20.23 KG/M2

## 2025-02-05 DIAGNOSIS — D86.9 SARCOIDOSIS: ICD-10-CM

## 2025-02-05 DIAGNOSIS — G20.C PARKINSONISM, UNSPECIFIED PARKINSONISM TYPE (H): Primary | ICD-10-CM

## 2025-02-05 DIAGNOSIS — R27.9 UNSPECIFIED LACK OF COORDINATION: ICD-10-CM

## 2025-02-05 PROCEDURE — G0463 HOSPITAL OUTPT CLINIC VISIT: HCPCS | Performed by: PSYCHIATRY & NEUROLOGY

## 2025-02-05 RX ORDER — PREDNISONE 5 MG/1
TABLET ORAL
Qty: 91 TABLET | Refills: 0 | Status: SHIPPED | OUTPATIENT
Start: 2025-02-05 | End: 2025-04-02

## 2025-02-05 ASSESSMENT — PAIN SCALES - GENERAL: PAINLEVEL_OUTOF10: NO PAIN (0)

## 2025-02-05 NOTE — NURSING NOTE
Chief Complaint   Patient presents with    MS    RECHECK     10 week follow up      Vitals were taken and medications were reconciled.   Jonny Thomas, EMT  11:01 AM

## 2025-02-05 NOTE — PROGRESS NOTES
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     He is accompanied by his wife, but provides most of the history independently.  He provides a list of things of concern for him today.    He reports that since his last visit with me his balance is only declined.  He is now reliant upon his walker as per the recommendation of his physical therapist.  He has had 1 fall.  This occurred because he fell toward the right side.  He is noticing an increase in tremor that is affecting his fine motor skills.  He stopped attending physical therapy appointments perhaps partly because it was challenging for him to attend these.  His legs still feel weak.  He finds it challenging to go up stairs.    He has appropriately been tapering prednisone.  He is down to 20 mg daily.  Continues to take CellCept.    He has developed constipation.  He has been managing this with Dulcolax as needed and is taking a daily stool softener.  Effective for him.    He struggles with fatigue that hits him in the afternoon.  However, it also seems that he does not routinely eat lunch.  He has also lost weight.  He has difficulty preparing foods for himself.    He is noticing an increase in tinnitus.    He did have a stroke in the past and subsequent to this he took an antihypertensive, though his blood pressure has declined and has subsequently been able to stop amlodipine.    He does notice that with regard to his bladder he is experiencing a decline in urgency, but denies any retention.    Treatment hx:   Prednisone 10/1/2024-present, tapering  Cellcept 11/2024-present    Allergies   Allergen Reactions    Wellbutrin [Bupropion Hcl] Anxiety       Current Outpatient Medications   Medication Sig Dispense Refill    acetaminophen 650 MG TABS Take 650 mg by mouth every 6 hours  as needed for mild pain 100 tablet     acyclovir (ZOVIRAX) 5 % ointment Apply topically 5 times daily. PRN      aspirin (ASA) 81 MG chewable tablet Take 81 mg by mouth daily.      atorvastatin (LIPITOR) 40 MG tablet Take 40 mg by mouth every evening      clotrimazole (LOTRIMIN) 1 % external cream Apply topically 2 times daily. To the affected area (rash) on the left shin until resolution. 30 g 1    diazepam (VALIUM) 5 MG tablet Take 1-2 tablets 30 minutes before MRI, 3rd tablet if needed. No driving for 8 hours after taking. 3 tablet 0    diphenhydrAMINE-acetaminophen (TYLENOL PM EXTRA STRENGTH)  MG tablet Take 1 tablet by mouth nightly as needed      FLUoxetine HCl (PROZAC PO) Take 10 mg by mouth daily       fluticasone (FLONASE) 50 MCG/ACT nasal spray 2 sprays      LORazepam (ATIVAN) 0.5 MG tablet If needed, take 1 tablet 30 minutes prior to CT scan / Take 1 tablet 30 minutes prior to MRI. Can take 2nd tablet immediately before MRI if needed. 3 tablet 0    mycophenolate (GENERIC EQUIVALENT) 500 MG tablet Take 1 tablet (500 mg) by mouth 2 times daily for 7 days, THEN 2 tablets (1,000 mg) 2 times daily. 120 tablet 11    predniSONE (DELTASONE) 20 MG tablet Take 3 tablets (60 mg) by mouth daily. 90 tablet 2    sulfamethoxazole-trimethoprim (BACTRIM) 400-80 MG tablet Take 1 tablet by mouth daily. 90 tablet 1    tamsulosin (FLOMAX) 0.4 MG capsule Take 2 capsules by mouth daily      amLODIPine (NORVASC) 5 MG tablet Take 5 mg by mouth daily (Patient not taking: Reported on 8/7/2024)      predniSONE (DELTASONE) 20 MG tablet Take 3 tablets (60 mg) by mouth daily. (Patient not taking: Reported on 2/5/2025) 90 tablet 0     No current facility-administered medications for this visit.        Past medical, surgical, social and family history was personally reviewed. Pertinent details noted above.     Physical Examination:   BP (!) 146/67 (BP Location: Left arm, Patient Position: Sitting, Cuff Size: Adult Small)   Pulse 96  "  Ht 1.783 m (5' 10.2\")   Wt 64.1 kg (141 lb 4.8 oz)   SpO2 97%   BMI 20.16 kg/m      General: no acute distress  Cranial nerves:   VFFC  PER, limitation of upgaze though could be w/in range for age  EOM full w/no NONI smooth pursuit choppy  Face symmetric  Hearing intact  No dysarthria   Motor:   Bulk is normal for age  Moderate Postural/action tremor, intention tremor worse on the left                          RL  Deltoid             5          5  Biceps             5          5  Triceps  5 5  Wrist ext 5 5  Finger ext 4+ 4+  Finger abd 5 5     Hip flexion       4+ 4+  Knee flexion 5 5  Knee ext 5 5  Ankle d/f 5 5      Romberg is present  Coordination:mild ataxia of the LE, no ataxia of UE   Gait: wide based gait with slight swing of right leg     Tests/Imaging:   CSF 65 wbc, lymphocytic   Pro 77  Glu 38  Flow cytometry polytypic b cells  Cytology - mixed acute and chronic inflammatory cells, neg malignancy  12 ocb  Elevated igg index     [place] autoimmune panel neg from serum      Ct c/a/p neg for malignancy or sarcoidosis     MRI Brain  7/2024-notable for non specific deep white matter hyperintensities rather confluent, atrophy noted w prominence of ventricles, no abnormal gadolinium enhancement including no evidence of leptomeningeal enhancement, no significant change when compared to 1/2024    MRI Cervical spine   12/2023-stenosis c5-6, no abnormal gd enhancement   8/2024 - stenosis most notable at c5-6, no evidence of myelitis     MRI Thoracic spine   12/2023 - normal appearing thoracic spinal cord, no abnormal gd enhancement   8/2024 - no myelitis    Assessment: 84-year-old man who has experienced a chronic progressive decline in gait.  This is in the setting of an inflammatory CSF profile.  Symptoms are suspected due to sarcoidosis, though we did not have biopsy confirmation.    He remains a diagnostic challenge.  Clinically he is declining despite a decent course of prednisone and the use of cellcept.  " With an inflammatory condition like sarcoidosis I would typically expect that he would experience more improvement with the steroids.  However, we discussed the possibility that past inflammation caused damage to the subarachnoid granulations that are responsible for csf reabsorption.  His gait does not appear magnetic to me as he has decent clearance from the floor, though this is with the aid of a walker.     He reports an increase in tremor.  We have previously discussed the possibility of atypical parkinsonism.  He has an action and postural tremor.  Balance is quite poor.  I have asked him to follow up with Dr. Wallis for an opinion on the cause of his tremor - atypical parkinsons, essential tremor, or cervical spine related.     He is pending a MONIK scan.  If this returns negative then I would consider repeating the MRI of the cervical spine and a lumbar drain.     Plan:   - prednisone taper   - stop bactrim   - continue cellcept   - home pt for balance/fall prevention  - referral to movement disorder specialist   - follow up in 4 months     Note was completed with the assistance of Dragon Fluency software which can often result in accidental word substitutions.     A total of 40 minutes on the date of service were spent in the care of this patient.   Jessie Shanks MD on 2/5/2025 at 11:12 AM

## 2025-02-05 NOTE — LETTER
2/5/2025       RE: Perry Soni  [address] [place] [address]     Dear Colleague,    Thank you for referring your patient, Perry Soni, to the [facility] [place] at [facility]. Please see a copy of my visit note below.    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     He is accompanied by his wife, but provides most of the history independently.  He provides a list of things of concern for him today.    He reports that since his last visit with me his balance is only declined.  He is now reliant upon his walker as per the recommendation of his physical therapist.  He has had 1 fall.  This occurred because he fell toward the right side.  He is noticing an increase in tremor that is affecting his fine motor skills.  He stopped attending physical therapy appointments perhaps partly because it was challenging for him to attend these.  His legs still feel weak.  He finds it challenging to go up stairs.    He has appropriately been tapering prednisone.  He is down to 20 mg daily.  Continues to take CellCept.    He has developed constipation.  He has been managing this with Dulcolax as needed and is taking a daily stool softener.  Effective for him.    He struggles with fatigue that hits him in the afternoon.  However, it also seems that he does not routinely eat lunch.  He has also lost weight.  He has difficulty preparing foods for himself.    He is noticing an increase in tinnitus.    He did have a stroke in the past and subsequent to this he took an antihypertensive, though his blood pressure has declined and has subsequently been able to stop amlodipine.    He does notice that with regard to his bladder he is experiencing a decline in  urgency, but denies any retention.    Treatment hx:   Prednisone 10/1/2024-present, tapering  Cellcept 11/2024-present    Allergies   Allergen Reactions     Wellbutrin [Bupropion Hcl] Anxiety       Current Outpatient Medications   Medication Sig Dispense Refill     acetaminophen 650 MG TABS Take 650 mg by mouth every 6 hours as needed for mild pain 100 tablet      acyclovir (ZOVIRAX) 5 % ointment Apply topically 5 times daily. PRN       aspirin (ASA) 81 MG chewable tablet Take 81 mg by mouth daily.       atorvastatin (LIPITOR) 40 MG tablet Take 40 mg by mouth every evening       clotrimazole (LOTRIMIN) 1 % external cream Apply topically 2 times daily. To the affected area (rash) on the left shin until resolution. 30 g 1     diazepam (VALIUM) 5 MG tablet Take 1-2 tablets 30 minutes before MRI, 3rd tablet if needed. No driving for 8 hours after taking. 3 tablet 0     diphenhydrAMINE-acetaminophen (TYLENOL PM EXTRA STRENGTH)  MG tablet Take 1 tablet by mouth nightly as needed       FLUoxetine HCl (PROZAC PO) Take 10 mg by mouth daily        fluticasone (FLONASE) 50 MCG/ACT nasal spray 2 sprays       LORazepam (ATIVAN) 0.5 MG tablet If needed, take 1 tablet 30 minutes prior to CT scan / Take 1 tablet 30 minutes prior to MRI. Can take 2nd tablet immediately before MRI if needed. 3 tablet 0     mycophenolate (GENERIC EQUIVALENT) 500 MG tablet Take 1 tablet (500 mg) by mouth 2 times daily for 7 days, THEN 2 tablets (1,000 mg) 2 times daily. 120 tablet 11     predniSONE (DELTASONE) 20 MG tablet Take 3 tablets (60 mg) by mouth daily. 90 tablet 2     sulfamethoxazole-trimethoprim (BACTRIM) 400-80 MG tablet Take 1 tablet by mouth daily. 90 tablet 1     tamsulosin (FLOMAX) 0.4 MG capsule Take 2 capsules by mouth daily       amLODIPine (NORVASC) 5 MG tablet Take 5 mg by mouth daily (Patient not taking: Reported on 8/7/2024)       predniSONE (DELTASONE) 20 MG tablet Take 3 tablets (60 mg) by mouth daily. (Patient not  "taking: Reported on 2/5/2025) 90 tablet 0     No current facility-administered medications for this visit.        Past medical, surgical, social and family history was personally reviewed. Pertinent details noted above.     Physical Examination:   BP (!) 146/67 (BP Location: Left arm, Patient Position: Sitting, Cuff Size: Adult Small)   Pulse 96   Ht 1.783 m (5' 10.2\")   Wt 64.1 kg (141 lb 4.8 oz)   SpO2 97%   BMI 20.16 kg/m      General: no acute distress  Cranial nerves:   VFFC  PER, limitation of upgaze though could be w/in range for age  EOM full w/no NONI smooth pursuit choppy  Face symmetric  Hearing intact  No dysarthria   Motor:   Bulk is normal for age  Moderate Postural/action tremor, intention tremor worse on the left                          RL  Deltoid             5          5  Biceps             5          5  Triceps  5 5  Wrist ext 5 5  Finger ext 4+ 4+  Finger abd 5 5     Hip flexion       4+ 4+  Knee flexion 5 5  Knee ext 5 5  Ankle d/f 5 5      Romberg is present  Coordination:mild ataxia of the LE, no ataxia of UE   Gait: wide based gait with slight swing of right leg     Tests/Imaging:   CSF 65 wbc, lymphocytic   Pro 77  Glu 38  Flow cytometry polytypic b cells  Cytology - mixed acute and chronic inflammatory cells, neg malignancy  12 ocb  Elevated igg index     [place] autoimmune panel neg from serum      Ct c/a/p neg for malignancy or sarcoidosis     MRI Brain  7/2024-notable for non specific deep white matter hyperintensities rather confluent, atrophy noted w prominence of ventricles, no abnormal gadolinium enhancement including no evidence of leptomeningeal enhancement, no significant change when compared to 1/2024    MRI Cervical spine   12/2023-stenosis c5-6, no abnormal gd enhancement   8/2024 - stenosis most notable at c5-6, no evidence of myelitis     MRI Thoracic spine   12/2023 - normal appearing thoracic spinal cord, no abnormal gd enhancement   8/2024 - no myelitis    Assessment: " 84-year-old man who has experienced a chronic progressive decline in gait.  This is in the setting of an inflammatory CSF profile.  Symptoms are suspected due to sarcoidosis, though we did not have biopsy confirmation.    He remains a diagnostic challenge.  Clinically he is declining despite a decent course of prednisone and the use of cellcept.  With an inflammatory condition like sarcoidosis I would typically expect that he would experience more improvement with the steroids.  However, we discussed the possibility that past inflammation caused damage to the subarachnoid granulations that are responsible for csf reabsorption.  His gait does not appear magnetic to me as he has decent clearance from the floor, though this is with the aid of a walker.     He reports an increase in tremor.  We have previously discussed the possibility of atypical parkinsonism.  He has an action and postural tremor.  Balance is quite poor.  I have asked him to follow up with Dr. Wallis for an opinion on the cause of his tremor - atypical parkinsons, essential tremor, or cervical spine related.     He is pending a MONIK scan.  If this returns negative then I would consider repeating the MRI of the cervical spine and a lumbar drain.     Plan:   - prednisone taper   - stop bactrim   - continue cellcept   - home pt for balance/fall prevention  - referral to movement disorder specialist   - follow up in 4 months     Note was completed with the assistance of Dragon Fluency software which can often result in accidental word substitutions.     A total of 40 minutes on the date of service were spent in the care of this patient.   Jessie Shanks MD on 2/5/2025 at 11:12 AM            Again, thank you for allowing me to participate in the care of your patient.      Sincerely,    Jessie Shanks MD

## 2025-02-05 NOTE — PATIENT INSTRUCTIONS
Continue stool softener to prevent constipation   If you go 2-3 days without a bowel movement despite the stool softener, then take senna (a stimulant for the bowel, available over the counter)     Continue cellcept for now     Have blood work done when you see primary care     Prednisone taper as per new prescription   You can stop the antibiotic    See Dr. Wallis for the tremor     Referral for home physical therapy placed    Follow up in 4 months

## 2025-03-20 ENCOUNTER — TELEPHONE (OUTPATIENT)
Dept: CARDIOLOGY | Facility: CLINIC | Age: 85
End: 2025-03-20
Payer: COMMERCIAL

## 2025-03-22 ENCOUNTER — HEALTH MAINTENANCE LETTER (OUTPATIENT)
Age: 85
End: 2025-03-22

## 2025-03-25 ENCOUNTER — DOCUMENTATION ONLY (OUTPATIENT)
Dept: CARDIOLOGY | Facility: CLINIC | Age: 85
End: 2025-03-25
Payer: COMMERCIAL

## 2025-04-01 ENCOUNTER — ANCILLARY PROCEDURE (OUTPATIENT)
Dept: NUCLEAR MEDICINE | Facility: CLINIC | Age: 85
End: 2025-04-01
Attending: PSYCHIATRY & NEUROLOGY
Payer: COMMERCIAL

## 2025-04-01 ENCOUNTER — ANCILLARY PROCEDURE (OUTPATIENT)
Dept: GENERAL RADIOLOGY | Facility: CLINIC | Age: 85
End: 2025-04-01
Attending: PSYCHIATRY & NEUROLOGY
Payer: COMMERCIAL

## 2025-04-01 DIAGNOSIS — G20.C PARKINSONISM, UNSPECIFIED PARKINSONISM TYPE (H): ICD-10-CM

## 2025-04-01 DIAGNOSIS — R27.9 UNSPECIFIED LACK OF COORDINATION: ICD-10-CM

## 2025-04-01 PROCEDURE — 78803 RP LOCLZJ TUM SPECT 1 AREA: CPT | Performed by: RADIOLOGY

## 2025-04-01 PROCEDURE — A9584 IODINE I-123 IOFLUPANE: HCPCS | Mod: JZ | Performed by: RADIOLOGY

## 2025-04-01 RX ORDER — IOFLUPANE I-123 2 MCI/ML
4-6 INJECTION, SOLUTION INTRAVENOUS ONCE
Status: COMPLETED | OUTPATIENT
Start: 2025-04-01 | End: 2025-04-01

## 2025-04-01 RX ADMIN — IOFLUPANE I-123 4.6 MILLICURIE: 2 INJECTION, SOLUTION INTRAVENOUS at 12:03

## 2025-04-01 RX ADMIN — Medication 130 MG: at 10:52

## (undated) RX ORDER — FENTANYL CITRATE 50 UG/ML
INJECTION, SOLUTION INTRAMUSCULAR; INTRAVENOUS
Status: DISPENSED
Start: 2024-05-01

## (undated) RX ORDER — FENTANYL CITRATE 50 UG/ML
INJECTION, SOLUTION INTRAMUSCULAR; INTRAVENOUS
Status: DISPENSED
Start: 2024-06-17

## (undated) RX ORDER — LIDOCAINE HYDROCHLORIDE 10 MG/ML
INJECTION, SOLUTION EPIDURAL; INFILTRATION; INTRACAUDAL; PERINEURAL
Status: DISPENSED
Start: 2024-05-01

## (undated) RX ORDER — SODIUM CHLORIDE 9 MG/ML
INJECTION, SOLUTION INTRAVENOUS
Status: DISPENSED
Start: 2024-06-17

## (undated) RX ORDER — LIDOCAINE HYDROCHLORIDE 10 MG/ML
INJECTION, SOLUTION EPIDURAL; INFILTRATION; INTRACAUDAL; PERINEURAL
Status: DISPENSED
Start: 2024-06-17

## (undated) RX ORDER — SODIUM CHLORIDE 9 MG/ML
INJECTION, SOLUTION INTRAVENOUS
Status: DISPENSED
Start: 2024-05-01